# Patient Record
Sex: FEMALE | Race: BLACK OR AFRICAN AMERICAN | Employment: UNEMPLOYED | URBAN - METROPOLITAN AREA
[De-identification: names, ages, dates, MRNs, and addresses within clinical notes are randomized per-mention and may not be internally consistent; named-entity substitution may affect disease eponyms.]

---
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
## 2022-01-18 ENCOUNTER — HOSPITAL ENCOUNTER (EMERGENCY)
Age: 22
Discharge: HOME | End: 2022-01-18
Attending: EMERGENCY MEDICINE
Payer: COMMERCIAL

## 2022-01-18 VITALS
HEART RATE: 69 BPM | DIASTOLIC BLOOD PRESSURE: 78 MMHG | OXYGEN SATURATION: 99 % | SYSTOLIC BLOOD PRESSURE: 118 MMHG | RESPIRATION RATE: 18 BRPM | TEMPERATURE: 97 F

## 2022-01-18 DIAGNOSIS — J06.9 VIRAL URI: Primary | ICD-10-CM

## 2022-01-18 PROCEDURE — 99283 EMERGENCY DEPT VISIT LOW MDM: CPT

## 2022-01-18 PROCEDURE — U0003 INFECTIOUS AGENT DETECTION BY NUCLEIC ACID (DNA OR RNA); SEVERE ACUTE RESPIRATORY SYNDROME CORONAVIRUS 2 (SARS-COV-2) (CORONAVIRUS DISEASE [COVID-19]), AMPLIFIED PROBE TECHNIQUE, MAKING USE OF HIGH THROUGHPUT TECHNOLOGIES AS DESCRIBED BY CMS-2020-01-R: HCPCS

## 2022-01-18 PROCEDURE — U0005 INFEC AGEN DETEC AMPLI PROBE: HCPCS

## 2022-01-18 RX ORDER — BENZONATATE 100 MG/1
100 CAPSULE ORAL 3 TIMES DAILY PRN
Qty: 15 CAPSULE | Refills: 0 | Status: SHIPPED | OUTPATIENT
Start: 2022-01-18 | End: 2022-01-25

## 2022-01-18 RX ORDER — ONDANSETRON 4 MG/1
4 TABLET, ORALLY DISINTEGRATING ORAL EVERY 8 HOURS PRN
Qty: 15 TABLET | Refills: 0 | Status: SHIPPED | OUTPATIENT
Start: 2022-01-18 | End: 2022-05-11

## 2022-01-18 RX ORDER — IBUPROFEN 600 MG/1
600 TABLET ORAL EVERY 6 HOURS PRN
Qty: 21 TABLET | Refills: 0 | Status: SHIPPED | OUTPATIENT
Start: 2022-01-18 | End: 2022-05-11

## 2022-01-18 RX ORDER — ACETAMINOPHEN 500 MG
1000 TABLET ORAL 4 TIMES DAILY PRN
Qty: 20 TABLET | Refills: 0 | Status: SHIPPED | OUTPATIENT
Start: 2022-01-18 | End: 2022-05-11

## 2022-01-18 ASSESSMENT — ENCOUNTER SYMPTOMS
RHINORRHEA: 1
VOMITING: 0
BLOOD IN STOOL: 0
DIARRHEA: 0
ABDOMINAL PAIN: 0
COUGH: 1
SORE THROAT: 1
CONSTIPATION: 0
SHORTNESS OF BREATH: 1
NAUSEA: 1

## 2022-01-18 ASSESSMENT — PAIN SCALES - GENERAL: PAINLEVEL_OUTOF10: 6

## 2022-01-18 NOTE — ED PROVIDER NOTES
[facility]  ED  Emergency Department Encounter  Emergency Medicine Resident     Pt Name: Lyndon Gamboa  IZQ:[id number]  [place] 2000  Date of evaluation: 1/18/22  PCP:  ALFREDO Bone CNP    CHIEF COMPLAINT       Chief Complaint   Patient presents with    Concern For COVID-19     cough, loss of taste, fever, congestion     HISTORY OF PRESENT ILLNESS  (Location/Symptom, Timing/Onset, Context/Setting, Quality, Duration, ModifyingFactors, Severity.)      Lyndon Gamboa is a 25 y.o. female who presents for evaluation of congestion, sore throat, cough, myalgias, sneezing, loss of taste, chest discomfort (particularly with cough), shortness of breath and mild nausea since yesterday. No fever, chills, abdominal pain, vomiting, diarrhea, vaginal symptoms, urinary symptoms. Patient had 1st 2 Pfizer vaccines 6 months ago. No known sick contacts. Has taken NyQuil, Benadryl at home. PAST MEDICAL / SURGICAL / SOCIAL /FAMILY HISTORY      has a past medical history of Eczema. No other pertinent PMH on review with patient/guardian. has no past surgical history on file. No other pertinent PSH on review with patient/guardian.   Social History     Socioeconomic History    Marital status: Single     Spouse name: Not on file    Number of children: Not on file    Years of education: Not on file    Highest education level: Not on file   Occupational History    Not on file   Tobacco Use    Smoking status: Never Smoker    Smokeless tobacco: Never Used   Substance and Sexual Activity    Alcohol use: No    Drug use: No    Sexual activity: Not Currently   Other Topics Concern    Not on file   Social History Narrative    Not on file     Social Determinants of Health     Financial Resource Strain:     Difficulty of Paying Living Expenses: Not on file   Food Insecurity:     Worried About Running Out of Food in the Last Year: Not on file    Akil of Food in the Last Year: Not on file   Transportation Needs:     Lack of Transportation (Medical): Not on file    Lack of Transportation (Non-Medical): Not on file   Physical Activity:     Days of Exercise per Week: Not on file    Minutes of Exercise per Session: Not on file   Stress:     Feeling of Stress : Not on file   Social Connections:     Frequency of Communication with Friends and Family: Not on file    Frequency of Social Gatherings with Friends and Family: Not on file    Attends Rastafarian Services: Not on file    Active Member of [address] [place] [address] [employer] or Organizations: Not on file    Attends Club or Organization Meetings: Not on file    Marital Status: Not on file   Intimate Partner Violence:     Fear of Current or Ex-Partner: Not on file    Emotionally Abused: Not on file    Physically Abused: Not on file    Sexually Abused: Not on file   Housing Stability:     Unable to Pay for Housing in the Last Year: Not on file    Number of [place] in the Last Year: Not on file    Unstable Housing in the Last Year: Not on file       I counseled the patient against using tobacco products. History reviewed. No pertinent family history. No other pertinent FamHx on review with patient/guardian. Allergies:  Patient has no known allergies. Home Medications:  Prior to Admission medications    Medication Sig Start Date End Date Taking?  Authorizing Provider   ibuprofen (IBU) 600 MG tablet Take 1 tablet by mouth every 6 hours as needed for Pain 1/18/22  Yes Carmelita Ram DO   acetaminophen (TYLENOL) 500 MG tablet Take 2 tablets by mouth 4 times daily as needed for Pain 1/18/22  Yes Carmelita Ram DO   ondansetron (ZOFRAN ODT) 4 MG disintegrating tablet Take 1 tablet by mouth every 8 hours as needed for Nausea 1/18/22  Yes Carmelita Ram DO   benzonatate (TESSALON PERLES) 100 MG capsule Take 1 capsule by mouth 3 times daily as needed for Cough 1/18/22 1/25/22 Yes Carmelita Ram DO   loratadine (CLARITIN) 10 MG tablet Take 1 tablet by mouth daily as needed (allergies)  Patient not taking: Reported on 10/24/2019 4/20/18   Syed Delgado MD   fluticasone (FLONASE) 50 MCG/ACT nasal spray 1 spray by Nasal route daily  Patient not taking: Reported on 10/24/2019 4/20/18   Syed Delgado MD       REVIEW OF SYSTEMS    (2-9 systems for level 4, 10 ormore for level 5)      Review of Systems   Constitutional: Negative for chills and fever. HENT: Positive for congestion, rhinorrhea and sore throat. Eyes: Negative for visual disturbance. Respiratory: Positive for cough and shortness of breath. Cardiovascular: Positive for chest pain. Gastrointestinal: Positive for nausea. Negative for abdominal pain, blood in stool, constipation, diarrhea and vomiting. Genitourinary: Negative for dysuria, frequency, urgency, vaginal bleeding and vaginal discharge. Skin: Negative for rash. Allergic/Immunologic: Negative for immunocompromised state. Neurological: Negative for headaches. Hematological: Does not bruise/bleed easily. PHYSICAL EXAM   (up to 7 for level 4, 8 or more for level 5)      INITIAL VITALS:   /78   Pulse 69   Temp 97 °F (36.1 °C) (Oral)   Resp 18   LMP  (LMP Unknown)   SpO2 99%     Physical Exam  Constitutional:       General: She is not in acute distress. Appearance: Normal appearance. She is not ill-appearing, toxic-appearing or diaphoretic. HENT:      Head: Normocephalic and atraumatic. Right Ear: External ear normal.      Left Ear: External ear normal.      Mouth/Throat:      Pharynx: No oropharyngeal exudate or posterior oropharyngeal erythema. Eyes:      General:         Right eye: No discharge. Left eye: No discharge. Cardiovascular:      Rate and Rhythm: Normal rate and regular rhythm. Pulses: Normal pulses. Heart sounds: No murmur heard. Pulmonary:      Effort: Pulmonary effort is normal. No respiratory distress. Breath sounds: Normal breath sounds.  No wheezing, rhonchi or rales. Abdominal:      Palpations: Abdomen is soft. Tenderness: There is no abdominal tenderness. There is no right CVA tenderness, left CVA tenderness, guarding or rebound. Skin:     Capillary Refill: Capillary refill takes less than 2 seconds. Neurological:      General: No focal deficit present. Mental Status: She is alert. DIFFERENTIAL  DIAGNOSIS     PLAN (LABS / IMAGING / EKG):  Orders Placed This Encounter   Procedures    COVID-19       MEDICATIONS ORDERED:  Orders Placed This Encounter   Medications    ibuprofen (IBU) 600 MG tablet     Sig: Take 1 tablet by mouth every 6 hours as needed for Pain     Dispense:  21 tablet     Refill:  0    acetaminophen (TYLENOL) 500 MG tablet     Sig: Take 2 tablets by mouth 4 times daily as needed for Pain     Dispense:  20 tablet     Refill:  0    ondansetron (ZOFRAN ODT) 4 MG disintegrating tablet     Sig: Take 1 tablet by mouth every 8 hours as needed for Nausea     Dispense:  15 tablet     Refill:  0    benzonatate (TESSALON PERLES) 100 MG capsule     Sig: Take 1 capsule by mouth 3 times daily as needed for Cough     Dispense:  15 capsule     Refill:  0       DIAGNOSTIC RESULTS / EMERGENCY DEPARTMENT COURSE / MDM     LABS:  No results found for this visit on 01/18/22. IMPRESSION/MDM/ED COURSE:  25 y.o. female presented with congestion, sore throat, cough, myalgias, sneezing, loss of taste, chest discomfort (particularly with cough), shortness of breath and mild nausea since yesterday. Patient afebrile vitals WNL. Patient signed 99 to 100% on room air. On exam patient resting comfortably no acute distress, nontoxic-appearing. Heart RRR, lungs clear. Abdomen soft nontender. Low risk for ACS, dissection. No fever or productive cough and lungs are clear therefore do not feel CXR is necessary at this time. Symptoms concerning for COVID, will obtain nonrapid swab as patient is well-appearing and stable for discharge.   Symptomatic treatment with Tylenol, ibuprofen, Tessalon Perles, Zofran. I discussed signs and symptoms that would require reevaluation in the ED. The patient expressed understanding and agreement with plan. All questions answered. RADIOLOGY:  No orders to display       EKG  None    All EKG's are interpreted by the Emergency Department Physician who either signs or Co-signs this chart in the absence of a cardiologist.    PROCEDURES:  None    CONSULTS:  None    FINAL IMPRESSION      1.  Viral URI          DISPOSITION / PLAN     DISPOSITION Decision To Discharge 01/18/2022 07:17:23 PM      PATIENT REFERREDTO:  Brandee Ibarra, APRN - CNP  [id number]  [phone]      As needed      DISCHARGE MEDICATIONS:  Discharge Medication List as of 1/18/2022  7:23 PM      START taking these medications    Details   ondansetron (ZOFRAN ODT) 4 MG disintegrating tablet Take 1 tablet by mouth every 8 hours as needed for Nausea, Disp-15 tablet, R-0Print      benzonatate (TESSALON PERLES) 100 MG capsule Take 1 capsule by mouth 3 times daily as needed for Cough, Disp-15 capsule, R-0Print             Skeet DO Liliana  PGY 2  Resident Physician Emergency Medicine  01/18/22 7:38 PM    (Please note that portions of this note were completed with a voice recognition program.Efforts were made to edit the dictations but occasionally words are mis-transcribed.)       Carmelita Ram DO  Resident  01/18/22 2990

## 2022-01-19 LAB
SARS-COV-2: ABNORMAL
SARS-COV-2: DETECTED
SOURCE: ABNORMAL

## 2022-01-19 NOTE — ED PROVIDER NOTES
with complaint of cough congestion body aches. Concern for COVID. No fevers. No chest pain no shortness of breath. Physical:     oral temperature is 97 °F (36.1 °C). Her blood pressure is 118/78 and her pulse is 69. Her respiration is 18 and oxygen saturation is 99%.    25 y.o. female no acute distress. Cardiac exam regular rate and rhythm no murmurs rubs gallops, pulmonary clear bilaterally abdomen soft nontender nondistended.     Impression: Concern for COVID    Plan: COVID swab, symptomatic treatment       Rosita Valverde MD, Santy Jurado  Attending Emergency Physician         Linda Cabrales MD  01/18/22 2001

## 2022-05-05 ENCOUNTER — HOSPITAL ENCOUNTER (EMERGENCY)
Age: 22
Discharge: HOME | End: 2022-05-05
Attending: EMERGENCY MEDICINE
Payer: COMMERCIAL

## 2022-05-05 VITALS
OXYGEN SATURATION: 99 % | TEMPERATURE: 98.6 F | RESPIRATION RATE: 18 BRPM | HEART RATE: 89 BPM | DIASTOLIC BLOOD PRESSURE: 74 MMHG | SYSTOLIC BLOOD PRESSURE: 119 MMHG

## 2022-05-05 DIAGNOSIS — R56.9 SEIZURE-LIKE ACTIVITY (HCC): Primary | ICD-10-CM

## 2022-05-05 LAB
HCG QUANTITATIVE: <1 MIU/ML
KEPPRA: 8 UG/ML

## 2022-05-05 PROCEDURE — 99284 EMERGENCY DEPT VISIT MOD MDM: CPT

## 2022-05-05 PROCEDURE — 80177 DRUG SCRN QUAN LEVETIRACETAM: CPT

## 2022-05-05 PROCEDURE — 99442 PR PHYS/QHP TELEPHONE EVALUATION 11-20 MIN: CPT | Performed by: PSYCHIATRY & NEUROLOGY

## 2022-05-05 PROCEDURE — 84702 CHORIONIC GONADOTROPIN TEST: CPT

## 2022-05-05 PROCEDURE — 93005 ELECTROCARDIOGRAM TRACING: CPT | Performed by: STUDENT IN AN ORGANIZED HEALTH CARE EDUCATION/TRAINING PROGRAM

## 2022-05-05 ASSESSMENT — ENCOUNTER SYMPTOMS
ABDOMINAL PAIN: 0
EYE PAIN: 0
ABDOMINAL DISTENTION: 0
SHORTNESS OF BREATH: 0
CONSTIPATION: 0
SORE THROAT: 0
SINUS PRESSURE: 0
COUGH: 0
SINUS PAIN: 0
EYE ITCHING: 0
NAUSEA: 0
DIARRHEA: 0

## 2022-05-05 NOTE — Clinical Note
Chelsie Gamboa was seen and treated in our emergency department on 5/5/2022. She may return to work on 05/06/2022. If you have any questions or concerns, please don't hesitate to call.       Zoraida David, DO

## 2022-05-06 LAB
EKG ATRIAL RATE: 76 BPM
EKG P AXIS: 35 DEGREES
EKG P-R INTERVAL: 144 MS
EKG Q-T INTERVAL: 348 MS
EKG QRS DURATION: 80 MS
EKG QTC CALCULATION (BAZETT): 391 MS
EKG R AXIS: -5 DEGREES
EKG T AXIS: 3 DEGREES
EKG VENTRICULAR RATE: 76 BPM

## 2022-05-06 NOTE — ED PROVIDER NOTES
[facility]  ED     Emergency Department     Faculty Attestation    I performed a history and physical examination of the patient and discussed management with the resident. I reviewed the residents note and agree with the documented findings and plan of care. Any areas of disagreement are noted on the chart. I was personally present for the key portions of any procedures. I have documented in the chart those procedures where I was not present during the key portions. I have reviewed the emergency nurses triage note. I agree with the chief complaint, past medical history, past surgical history, allergies, medications, social and family history as documented unless otherwise noted below. For Physician Assistant/ Nurse Practitioner cases/documentation I have personally evaluated this patient and have completed at least one if not all key elements of the E/M (history, physical exam, and MDM). Additional findings are as noted. Patient here with reported seizures seem to be triggered by flashing bright lights. No history of seizures prior to this. Was seen at [facility] started on [address] from the ED but not admitted. Significant other states trying to get into neurology but has not seen one yet. Episode earlier today and per significant other had 1 in the waiting room although was not reported to me by triage staff. On exam patient has dark sunglasses on to shield her eyes from the light. EKG interpretation: Sinus rhythm 76 normal intervals. Normal axis. No acute ST or T changes.     Critical Care     none    Radha Fowler MD, Tona Recinos  Attending Emergency  Physician             Radha Fowler MD  05/05/22 8230

## 2022-05-06 NOTE — ED PROVIDER NOTES
[facility]  ED  Emergency Department Encounter  EmergencyMedicine Resident     Pt Wanda Grajeda  MRN: [id number]  [place] 2000  Date of evaluation: 5/5/22  PCP:  ALFREDO Lynch CNP    This patient was evaluated in the Emergency Department for symptoms described in the history of present illness. The patient was evaluated in the context of the global COVID-19 pandemic, which necessitated consideration that the patient might be at risk for infection with the SARS-CoV-2 virus that causes COVID-19. Institutional protocols and algorithms that pertain to the evaluation of patients at risk for COVID-19 are in a state of rapid change based on information released by regulatory bodies including the CDC and federal and state organizations. These policies and algorithms were followed during the patient's care in the ED. CHIEF COMPLAINT       Chief Complaint   Patient presents with    Seizures       HISTORY OF PRESENT ILLNESS  (Location/Symptom, Timing/Onset, Context/Setting, Quality, Duration, Modifying Factors, Severity.)      Dallin Toscano is a 25 y.o. female who presents with seizure-like activity. Patient provides no history and all information is provided by her boyfriend. Boyfriend states that he originally came to the emergency department for a doctor's notes that the patient could continue working but while in the waiting room the patient experienced another seizure-like episode. They are described as upper extremity and shoulder shaking typically triggered by bright lights followed by a period of somnolence. These started about 1 week ago and episodes typically last for 1 to 2 minutes. Only one episode has been witnessed today. Boyfriend states that the patient was having another episode during examination, however the patient is following commands although not responding verbally to questions.   Of note they were seen less than 1 week ago at [facility] for similar symptoms and CT head at that time was negative. Full metabolic assessment was performed including prolactin, thyroid levels, hCG, blood counts and metabolic panel, all were unremarkable. They were advised to follow up with neuro and started on keppra. Patient is reportedly otherwise healthy. PAST MEDICAL / SURGICAL / SOCIAL / FAMILY HISTORY      has a past medical history of Eczema. has no past surgical history on file. Social History     Socioeconomic History    Marital status: Single     Spouse name: Not on file    Number of children: Not on file    Years of education: Not on file    Highest education level: Not on file   Occupational History    Not on file   Tobacco Use    Smoking status: Never Smoker    Smokeless tobacco: Never Used   Substance and Sexual Activity    Alcohol use: No    Drug use: No    Sexual activity: Not Currently   Other Topics Concern    Not on file   Social History Narrative    Not on file     Social Determinants of Health     Financial Resource Strain:     Difficulty of Paying Living Expenses: Not on file   Food Insecurity:     Worried About Running Out of Food in the Last Year: Not on file    Akil of Food in the Last Year: Not on file   Transportation Needs:     Lack of Transportation (Medical): Not on file    Lack of Transportation (Non-Medical):  Not on file   Physical Activity:     Days of Exercise per Week: Not on file    Minutes of Exercise per Session: Not on file   Stress:     Feeling of Stress : Not on file   Social Connections:     Frequency of Communication with Friends and Family: Not on file    Frequency of Social Gatherings with Friends and Family: Not on file    Attends Tenriism Services: Not on file    Active Member of Clubs or Organizations: Not on file    Attends Club or Organization Meetings: Not on file    Marital Status: Not on file   Intimate Partner Violence:     Fear of Current or Ex-Partner: Not on file   [employer] Abused: Not on file    Physically Abused: Not on file    Sexually Abused: Not on file   Housing Stability:     Unable to Pay for Housing in the Last Year: Not on file    Number of Places Lived in the Last Year: Not on file    Unstable Housing in the Last Year: Not on file       No family history on file. Allergies:  Patient has no known allergies. Home Medications:  Prior to Admission medications    Medication Sig Start Date End Date Taking? Authorizing Provider   ibuprofen (IBU) 600 MG tablet Take 1 tablet by mouth every 6 hours as needed for Pain 1/18/22   Francheska Comber, DO   acetaminophen (TYLENOL) 500 MG tablet Take 2 tablets by mouth 4 times daily as needed for Pain 1/18/22   Francheska Comber, DO   ondansetron (ZOFRAN ODT) 4 MG disintegrating tablet Take 1 tablet by mouth every 8 hours as needed for Nausea 1/18/22   Francheska Comber, DO   loratadine (CLARITIN) 10 MG tablet Take 1 tablet by mouth daily as needed (allergies)  Patient not taking: Reported on 10/24/2019 4/20/18   Terrell Martini MD   fluticasone (FLONASE) 50 MCG/ACT nasal spray 1 spray by Nasal route daily  Patient not taking: Reported on 10/24/2019 4/20/18   Terrell Martini MD       REVIEW OF SYSTEMS    (2-9 systems for level 4, 10 or more for level 5)      Review of Systems   Constitutional: Negative for activity change, chills and fever. HENT: Negative for congestion, sinus pressure, sinus pain and sore throat. Eyes: Negative for pain and itching. Respiratory: Negative for cough and shortness of breath. Cardiovascular: Negative for chest pain. Gastrointestinal: Negative for abdominal distention, abdominal pain, constipation, diarrhea and nausea. Endocrine: Negative for polyuria. Genitourinary: Negative for dysuria and frequency. Musculoskeletal: Negative for arthralgias. Skin: Negative for rash. Neurological: Positive for seizures. Negative for light-headedness and headaches.        PHYSICAL EXAM   (up to 7 for level 4, 8 or more for level 5)      INITIAL VITALS:   /74   Pulse 89   Temp 98.6 °F (37 °C)   Resp 18   SpO2 99%     Physical Exam  Vitals reviewed. Constitutional:       General: She is not in acute distress. HENT:      Head: Normocephalic and atraumatic. Ears:      Comments: Hearing grossly normal     Nose: Nose normal.      Mouth/Throat:      Mouth: Mucous membranes are moist.      Pharynx: Oropharynx is clear. Eyes:      General: No scleral icterus. Conjunctiva/sclera: Conjunctivae normal.      Pupils: Pupils are equal, round, and reactive to light. Cardiovascular:      Rate and Rhythm: Normal rate and regular rhythm. Pulses: Normal pulses. Pulmonary:      Effort: Pulmonary effort is normal. No respiratory distress. Breath sounds: Normal breath sounds. Abdominal:      General: There is no distension. Tenderness: There is no abdominal tenderness. There is no guarding. Musculoskeletal:      Cervical back: No muscular tenderness. Right lower leg: No edema. Left lower leg: No edema. Skin:     General: Skin is warm and dry. Capillary Refill: Capillary refill takes less than 2 seconds. Neurological:      General: No focal deficit present. Mental Status: She is alert. Mental status is at baseline. Comments: Not responding to questions but she does follow commands to squeeze hands, push and pull. Pupils are equal and reactive. She is only intermittently cooperative. DIFFERENTIAL  DIAGNOSIS     PLAN (LABS / IMAGING / EKG):  Orders Placed This Encounter   Procedures    HCG, QUANTITATIVE, PREGNANCY    Levetiracetam Level    Inpatient consult to Neurology    EKG 12 Lead       MEDICATIONS ORDERED:  No orders of the defined types were placed in this encounter.         DIAGNOSTIC RESULTS / EMERGENCY DEPARTMENT COURSE / MDM   LAB RESULTS:  Results for orders placed or performed during the hospital encounter of 05/05/22   HCG, QUANTITATIVE, PREGNANCY   Result Value Ref Range    hCG Quant <1 <5 mIU/mL   Levetiracetam Level   Result Value Ref Range    Levetiracetam Lvl 8 ug/mL       IMPRESSION: You Curtis is a 25 y.o. woman presenting for seizure-like activity. She was recently seen for the same and had a negative work-up and was advised to follow-up outpatient for an EEG. She has not had any confirmatory EEGs at this point. Her boyfriend did report that she was experiencing a minor episode of this reported witnessed activity during physical exam however she was still able to follow commands during this time even though she was verbally unresponsive. This did resolve during her observation in the emergency department and she was later able to answer questions appropriately. 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 Keppra levels were found to be therapeutic. Negative pregnancy test.  She was seen by neurology and recommended for outpatient follow-up w/EEG and continue keppra w/o dose change. RADIOLOGY:  No results found. EKG  Normal rate, sinus, normal axis, no ST segment elevations or depressions    All EKG's are interpreted by the Emergency Department Physician who either signs or Co-signs this chart in the absence of a cardiologist.    EMERGENCY DEPARTMENT COURSE:  Patient seen and evaluated, VSS and nontoxic in appearance. ED Course as of 05/06/22 0141   Thu May 05, 2022   2231 Seen by neurology, rec outpt EEG, continue keppra with no dose change [LR]   2232 Levetiracetam: 8 [LR]      ED Course User Index  [LR] Zoraida David,         No notes of EC Admission Criteria type on file. PROCEDURES:  none    CONSULTS:  IP CONSULT TO NEUROLOGY    CRITICAL CARE:  See attending note    FINAL IMPRESSION      1.  Seizure-like activity ([facility] [address]          DISPOSITION / PLAN     DISPOSITION Decision To Discharge 05/05/2022 10:45:40 PM      PATIENT REFERRED TO:  ALFREDO Lincoln - CNP  [id number] [id number]  [phone]      As needed, If symptoms worsen    [facility]  [address] [facility] [address]  [phone]    As needed, If symptoms worsen      DISCHARGE MEDICATIONS:  Discharge Medication List as of 5/5/2022 10:31 PM          Ciera Hernandez DO  Emergency Medicine Resident    (Please note that portions of thisnote were completed with a voice recognition program.  Efforts were made to edit the dictations but occasionally words are mis-transcribed.)       Ciera Hernandez DO  Resident  05/06/22 0850

## 2022-05-11 ENCOUNTER — OFFICE VISIT (OUTPATIENT)
Dept: PRIMARY CARE CLINIC | Age: 22
End: 2022-05-11
Payer: COMMERCIAL

## 2022-05-11 VITALS
BODY MASS INDEX: 31.21 KG/M2 | OXYGEN SATURATION: 98 % | DIASTOLIC BLOOD PRESSURE: 62 MMHG | SYSTOLIC BLOOD PRESSURE: 102 MMHG | HEART RATE: 74 BPM | HEIGHT: 62 IN | RESPIRATION RATE: 16 BRPM | WEIGHT: 169.6 LBS

## 2022-05-11 DIAGNOSIS — Z76.89 ENCOUNTER TO ESTABLISH CARE: Primary | ICD-10-CM

## 2022-05-11 DIAGNOSIS — R56.9 SEIZURE-LIKE ACTIVITY (HCC): ICD-10-CM

## 2022-05-11 DIAGNOSIS — R42 EPISODIC LIGHTHEADEDNESS: ICD-10-CM

## 2022-05-11 PROCEDURE — 1036F TOBACCO NON-USER: CPT | Performed by: PHYSICIAN ASSISTANT

## 2022-05-11 PROCEDURE — G8417 CALC BMI ABV UP PARAM F/U: HCPCS | Performed by: PHYSICIAN ASSISTANT

## 2022-05-11 PROCEDURE — G8427 DOCREV CUR MEDS BY ELIG CLIN: HCPCS | Performed by: PHYSICIAN ASSISTANT

## 2022-05-11 PROCEDURE — 99204 OFFICE O/P NEW MOD 45 MIN: CPT | Performed by: PHYSICIAN ASSISTANT

## 2022-05-11 RX ORDER — LEVETIRACETAM 250 MG/1
TABLET ORAL
COMMUNITY
Start: 2022-05-09 | End: 2022-06-27 | Stop reason: DRUGHIGH

## 2022-05-11 RX ORDER — LEVETIRACETAM 500 MG/1
500 TABLET ORAL 2 TIMES DAILY
COMMUNITY
Start: 2022-04-30 | End: 2022-06-27 | Stop reason: SDUPTHER

## 2022-05-11 SDOH — ECONOMIC STABILITY: FOOD INSECURITY: WITHIN THE PAST 12 MONTHS, YOU WORRIED THAT YOUR FOOD WOULD RUN OUT BEFORE YOU GOT MONEY TO BUY MORE.: NEVER TRUE

## 2022-05-11 SDOH — ECONOMIC STABILITY: FOOD INSECURITY: WITHIN THE PAST 12 MONTHS, THE FOOD YOU BOUGHT JUST DIDN'T LAST AND YOU DIDN'T HAVE MONEY TO GET MORE.: NEVER TRUE

## 2022-05-11 SDOH — ECONOMIC STABILITY: TRANSPORTATION INSECURITY
IN THE PAST 12 MONTHS, HAS THE LACK OF TRANSPORTATION KEPT YOU FROM MEDICAL APPOINTMENTS OR FROM GETTING MEDICATIONS?: NO

## 2022-05-11 SDOH — ECONOMIC STABILITY: TRANSPORTATION INSECURITY
IN THE PAST 12 MONTHS, HAS LACK OF TRANSPORTATION KEPT YOU FROM MEETINGS, WORK, OR FROM GETTING THINGS NEEDED FOR DAILY LIVING?: NO

## 2022-05-11 ASSESSMENT — ENCOUNTER SYMPTOMS
CONSTIPATION: 0
VOMITING: 0
COUGH: 0
SINUS PAIN: 0
NAUSEA: 0
ABDOMINAL PAIN: 0
BACK PAIN: 0
RHINORRHEA: 0
PHOTOPHOBIA: 1
SHORTNESS OF BREATH: 0
DIARRHEA: 0

## 2022-05-11 ASSESSMENT — PATIENT HEALTH QUESTIONNAIRE - PHQ9
SUM OF ALL RESPONSES TO PHQ QUESTIONS 1-9: 0
SUM OF ALL RESPONSES TO PHQ QUESTIONS 1-9: 0
2. FEELING DOWN, DEPRESSED OR HOPELESS: 0
1. LITTLE INTEREST OR PLEASURE IN DOING THINGS: 0
SUM OF ALL RESPONSES TO PHQ9 QUESTIONS 1 & 2: 0
SUM OF ALL RESPONSES TO PHQ QUESTIONS 1-9: 0
SUM OF ALL RESPONSES TO PHQ QUESTIONS 1-9: 0

## 2022-05-11 ASSESSMENT — SOCIAL DETERMINANTS OF HEALTH (SDOH): HOW HARD IS IT FOR YOU TO PAY FOR THE VERY BASICS LIKE FOOD, HOUSING, MEDICAL CARE, AND HEATING?: NOT VERY HARD

## 2022-05-11 NOTE — PROGRESS NOTES
[address] PRIMARY CARE  [address] [place]  [address]  [address] [address]  Dept: [phone]  Dept Fax: [phone]    Bakari Cardona is a 25 y.o. female who presents today for her medical conditions/complaints as noted below. Chief Complaint   Patient presents with   [address]     Follow up from [facility] ED on 2022 for seizure like activity, needs referral to Neurology       HPI:     Patient presents to the office to establish care. No recent PCP. She is accompanied by boyfriend who helps provide history. Primary concern is following up on seizure-like activity. She reports for the past 10 days she has had intermittent episodes of seizures/lightheadedness/dizziness. She has been evaluated at local ED on 3 separate occasions including [facility], [facility], [facility].  She has had CT scan and lab work-up and EKG which are negative. She has been discharged and is currently taking 750 mg Keppra twice daily. Patient denies any known head injury/trauma. No prior history of seizures or surgery. She reports intermittent episodes of lightheadedness, dizziness, shakiness, possible seizures. These are intermittent throughout the day. They are exacerbated with bright lights, stress, feeling worked up. She cannot identify specific loss of consciousness, convulsions, loss of bowel or bladder function. No current concerns for anxiety or depression. No significant family history of seizure disorder. Patient does report that she is supposed to be wearing corrective lenses, however not followed up with eye specialist in a while. No other concerns or complaints. BP stable. Weight stable.         No results found for: LABA1C          ( goal A1C is < 7)   No results found for: LABMICR  No results found for: LDLCHOLESTEROL, LDLCALC    (goal LDL is <100)   AST (U/L)   Date Value   2017 18     ALT (U/L)   Date Value   2017 8     BP Readings from Last 3 Encounters:   05/11/22 102/62   05/05/22 119/74   01/18/22 118/78          (goal 120/80)    Past Medical History:   Diagnosis Date    Eczema       History reviewed. No pertinent surgical history. History reviewed. No pertinent family history. Social History     Tobacco Use    Smoking status: Never Smoker    Smokeless tobacco: Never Used   Substance Use Topics    Alcohol use: No      Current Outpatient Medications   Medication Sig Dispense Refill    levETIRAcetam (KEPPRA) 500 MG tablet Take 500 mg by mouth 2 times daily      levETIRAcetam (KEPPRA) 250 MG tablet TAKE 1 TABLET BY MOUTH TWICE DAILY       No current facility-administered medications for this visit. No Known Allergies    Health Maintenance   Topic Date Due    Varicella vaccine (1 of 2 - 2-dose childhood series) Never done    COVID-19 Vaccine (1) Never done    Depression Screen  Never done    HIV screen  Never done    Hepatitis C screen  Never done    Flu vaccine (Season Ended) 09/01/2022    Chlamydia screen  11/22/2022    DTaP/Tdap/Td vaccine (2 - Td or Tdap) 11/05/2024    HPV vaccine  Completed    Meningococcal (ACWY) vaccine  Completed    Hepatitis A vaccine  Aged Out    Hepatitis B vaccine  Aged Out    Hib vaccine  Aged Out    Pneumococcal 0-64 years Vaccine  Aged Out       Subjective:      Review of Systems   Constitutional: Negative for chills, fatigue and fever. HENT: Negative for congestion, rhinorrhea and sinus pain. Eyes: Positive for photophobia. Respiratory: Negative for cough and shortness of breath. Cardiovascular: Negative for chest pain and leg swelling. Gastrointestinal: Negative for abdominal pain, constipation, diarrhea, nausea and vomiting. Genitourinary: Negative for difficulty urinating, frequency and urgency. Musculoskeletal: Negative for arthralgias, back pain and myalgias. Neurological: Positive for dizziness, seizures, light-headedness and headaches. Negative for tremors, facial asymmetry, speech difficulty and weakness. Psychiatric/Behavioral: Negative for confusion, dysphoric mood and sleep disturbance. The patient is not nervous/anxious. All other systems reviewed and are negative. Objective:     Physical Exam  Vitals and nursing note reviewed. Constitutional:       General: She is not in acute distress. Appearance: Normal appearance. She is obese. HENT:      Head: Normocephalic. Mouth/Throat:      Mouth: Mucous membranes are moist.   Eyes:      Extraocular Movements: Extraocular movements intact. Conjunctiva/sclera: Conjunctivae normal.      Pupils: Pupils are equal, round, and reactive to light. Cardiovascular:      Rate and Rhythm: Normal rate and regular rhythm. Pulses: Normal pulses. Heart sounds: Normal heart sounds. Pulmonary:      Effort: Pulmonary effort is normal.      Breath sounds: Normal breath sounds. Abdominal:      General: Abdomen is flat. Bowel sounds are normal.      Palpations: Abdomen is soft. Tenderness: There is no abdominal tenderness. Musculoskeletal:      Cervical back: Normal range of motion. Right lower leg: No edema. Left lower leg: No edema. Lymphadenopathy:      Cervical: No cervical adenopathy. Skin:     General: Skin is warm. Capillary Refill: Capillary refill takes less than 2 seconds. Neurological:      General: No focal deficit present. Mental Status: She is alert and oriented to person, place, and time. GCS: GCS eye subscore is 4. GCS verbal subscore is 5. GCS motor subscore is 6. Cranial Nerves: Cranial nerves are intact. No cranial nerve deficit or facial asymmetry. Motor: Motor function is intact. No weakness. Gait: Gait is intact. Gait normal.      Comments: She does present in sunglasses to block bright lights.    Psychiatric:         Mood and Affect: Mood normal.         Behavior: Behavior normal.       /62 (Site: Left Upper Arm, Position: Sitting, Cuff Size: Medium Adult)   Pulse 74   Resp 16   Ht 5' 1.5\" (1.562 m)   Wt 169 lb 9.6 oz (76.9 kg)   SpO2 98%   Breastfeeding No   BMI 31.53 kg/m²     Assessment:       ICD-10-CM    1. Encounter to establish care  Z76.89    2. Seizure-like activity (HCC)  R56.9 EEG     MRI BRAIN W WO CONTRAST   3. Episodic lightheadedness  R42             Plan:       1. Initial visit to establish care.  2, 3. There is concern for new onset seizure-like activity. She has been evaluated in the ED multiple times. She is currently taking Keppra 750 mg twice daily. She has an appointment with neurology coming up in August.  Plan to obtain MRI brain and EEG before neurology appointment. I discussed that periodic lightheadedness can also be due to dehydration, stress, poor sleep. I advised importance of sleeping schedule, regular hydration and compression socks. Reviewed significant medical history, prior charts, documentation. Return in about 3 months (around 8/22/2022) for after following with Neurology. Orders Placed This Encounter   Procedures    MRI BRAIN W WO CONTRAST     Standing Status:   Future     Standing Expiration Date:   5/11/2023     Order Specific Question:   STAT Creatinine as needed:     Answer:   Yes     Order Specific Question:   Reason for exam:     Answer:   recent history of seizure like activity     Order Specific Question:   What is the sedation requirement? Answer:   None    EEG     Standing Status:   Future     Standing Expiration Date:   7/10/2022         Patient given educational materials - see patient instructions. Discussed use, benefit, and side effects of prescribed medications. All patient questions answered. Pt voiced understanding. Reviewed health maintenance. Instructed to continue current medications, diet and exercise. Patient agreed with treatment plan. Follow up as directed.         Electronically signed by Asuncion Miller PA-C on 5/11/2022 at 12:32 PM

## 2022-06-27 ENCOUNTER — TELEPHONE (OUTPATIENT)
Dept: PRIMARY CARE CLINIC | Age: 22
End: 2022-06-27

## 2022-06-27 RX ORDER — LEVETIRACETAM 500 MG/1
500 TABLET ORAL 2 TIMES DAILY
Qty: 60 TABLET | Refills: 1 | Status: SHIPPED | OUTPATIENT
Start: 2022-06-27 | End: 2022-08-09 | Stop reason: SDUPTHER

## 2022-06-27 NOTE — TELEPHONE ENCOUNTER
----- Message from [place] Terese sent at 6/27/2022 10:43 AM EDT -----  Subject: Message to Provider    QUESTIONS  Information for Provider? pt is wanting to ask pcp if its safe to travel   with her Seizures pt also has a question about a med she is taking   levETIRAcetam (KEPPRA) 500 MG tablet   ---------------------------------------------------------------------------  --------------  CALL BACK INFO  What is the best way for the office to contact you? OK to leave message on   voicemail  Preferred Call Back Phone Number? [phone]  ---------------------------------------------------------------------------  --------------  SCRIPT ANSWERS  Relationship to Patient?  Self

## 2022-06-27 NOTE — TELEPHONE ENCOUNTER
Pt informed, Pt is asking is you can fill the Keppra 500 Mg so she doesn't have to take multiple a day for the 250 Mg.  Please advice

## 2022-06-27 NOTE — TELEPHONE ENCOUNTER
Please advise patient that she should not be driving since last seizure was within 2 to 3 months. Also, make sure she is compliant with Keppra. I do recommend completing MRI and EEG before seeing neurology in August.  She can travel as long as she is a passenger.     Sunday Yisel

## 2022-06-30 DIAGNOSIS — R56.9 SEIZURE-LIKE ACTIVITY (HCC): Primary | ICD-10-CM

## 2022-06-30 RX ORDER — LEVETIRACETAM 250 MG/1
250 TABLET ORAL 2 TIMES DAILY
Qty: 60 TABLET | Refills: 0 | Status: SHIPPED | OUTPATIENT
Start: 2022-06-30

## 2022-08-09 RX ORDER — LEVETIRACETAM 500 MG/1
500 TABLET ORAL 2 TIMES DAILY
Qty: 60 TABLET | Refills: 1 | Status: SHIPPED | OUTPATIENT
Start: 2022-08-09 | End: 2022-09-08

## 2022-08-17 ENCOUNTER — HOSPITAL ENCOUNTER (INPATIENT)
Age: 22
LOS: 1 days | Discharge: HOME | DRG: 053 | End: 2022-08-18
Attending: EMERGENCY MEDICINE | Admitting: INTERNAL MEDICINE
Payer: COMMERCIAL

## 2022-08-17 ENCOUNTER — APPOINTMENT (OUTPATIENT)
Dept: MRI IMAGING | Age: 22
DRG: 053 | End: 2022-08-17
Payer: COMMERCIAL

## 2022-08-17 ENCOUNTER — OFFICE VISIT (OUTPATIENT)
Dept: NEUROLOGY | Age: 22
End: 2022-08-17
Payer: COMMERCIAL

## 2022-08-17 VITALS
SYSTOLIC BLOOD PRESSURE: 95 MMHG | HEIGHT: 63 IN | BODY MASS INDEX: 29.66 KG/M2 | WEIGHT: 167.4 LBS | HEART RATE: 74 BPM | DIASTOLIC BLOOD PRESSURE: 67 MMHG

## 2022-08-17 DIAGNOSIS — R56.9 SEIZURE-LIKE ACTIVITY (HCC): Primary | ICD-10-CM

## 2022-08-17 PROBLEM — G40.919 BREAKTHROUGH SEIZURE (HCC): Status: ACTIVE | Noted: 2022-08-17

## 2022-08-17 LAB
ABSOLUTE EOS #: 0.09 K/UL (ref 0–0.4)
ABSOLUTE IMMATURE GRANULOCYTE: 0 K/UL (ref 0–0.3)
ABSOLUTE LYMPH #: 2.64 K/UL (ref 1.2–5.2)
ABSOLUTE MONO #: 0.26 K/UL (ref 0.2–0.8)
ANION GAP SERPL CALCULATED.3IONS-SCNC: 8 MMOL/L (ref 9–17)
ATYPICAL LYMPHOCYTE ABSOLUTE COUNT: 0.17 K/UL
ATYPICAL LYMPHOCYTES: 2 %
BASOPHILS # BLD: 0 %
BASOPHILS ABSOLUTE: 0 K/UL (ref 0–0.2)
BUN BLDV-MCNC: 4 MG/DL (ref 6–20)
BUN/CREAT BLD: 5 (ref 9–20)
CALCIUM SERPL-MCNC: 8.9 MG/DL (ref 8.6–10.4)
CHLORIDE BLD-SCNC: 102 MMOL/L (ref 98–107)
CO2: 28 MMOL/L (ref 20–31)
CREAT SERPL-MCNC: 0.8 MG/DL (ref 0.5–0.9)
EOSINOPHILS RELATIVE PERCENT: 1 % (ref 1–4)
GFR NON-AFRICAN AMERICAN: ABNORMAL ML/MIN
GFR SERPL CREATININE-BSD FRML MDRD: ABNORMAL ML/MIN/{1.73_M2}
GLUCOSE BLD-MCNC: 90 MG/DL (ref 70–99)
HCT VFR BLD CALC: 38.9 % (ref 36.3–47.1)
HEMOGLOBIN: 12 G/DL (ref 11.9–15.1)
IMMATURE GRANULOCYTES: 0 %
KEPPRA: 5 UG/ML
LYMPHOCYTES # BLD: 31 % (ref 25–45)
MCH RBC QN AUTO: 23.6 PG (ref 25.2–33.5)
MCHC RBC AUTO-ENTMCNC: 30.8 G/DL (ref 28.4–34.8)
MCV RBC AUTO: 76.4 FL (ref 82.6–102.9)
MONOCYTES # BLD: 3 % (ref 2–8)
NRBC AUTOMATED: 0 PER 100 WBC
PDW BLD-RTO: 15 % (ref 11.8–14.4)
PLATELET # BLD: 328 K/UL (ref 138–453)
PMV BLD AUTO: 11.2 FL (ref 8.1–13.5)
POTASSIUM SERPL-SCNC: 3.6 MMOL/L (ref 3.7–5.3)
RBC # BLD: 5.09 M/UL (ref 3.95–5.11)
SEG NEUTROPHILS: 63 % (ref 34–64)
SEGMENTED NEUTROPHILS ABSOLUTE COUNT: 5.34 K/UL (ref 1.8–8)
SODIUM BLD-SCNC: 138 MMOL/L (ref 135–144)
WBC # BLD: 8.5 K/UL (ref 4.5–13.5)

## 2022-08-17 PROCEDURE — G8427 DOCREV CUR MEDS BY ELIG CLIN: HCPCS | Performed by: STUDENT IN AN ORGANIZED HEALTH CARE EDUCATION/TRAINING PROGRAM

## 2022-08-17 PROCEDURE — 2060000000 HC ICU INTERMEDIATE R&B

## 2022-08-17 PROCEDURE — 99221 1ST HOSP IP/OBS SF/LOW 40: CPT | Performed by: NURSE PRACTITIONER

## 2022-08-17 PROCEDURE — 2580000003 HC RX 258: Performed by: NURSE PRACTITIONER

## 2022-08-17 PROCEDURE — 80177 DRUG SCRN QUAN LEVETIRACETAM: CPT

## 2022-08-17 PROCEDURE — G0378 HOSPITAL OBSERVATION PER HR: HCPCS

## 2022-08-17 PROCEDURE — 80048 BASIC METABOLIC PNL TOTAL CA: CPT

## 2022-08-17 PROCEDURE — 85025 COMPLETE CBC W/AUTO DIFF WBC: CPT

## 2022-08-17 PROCEDURE — G8417 CALC BMI ABV UP PARAM F/U: HCPCS | Performed by: STUDENT IN AN ORGANIZED HEALTH CARE EDUCATION/TRAINING PROGRAM

## 2022-08-17 PROCEDURE — 99205 OFFICE O/P NEW HI 60 MIN: CPT | Performed by: STUDENT IN AN ORGANIZED HEALTH CARE EDUCATION/TRAINING PROGRAM

## 2022-08-17 PROCEDURE — 70553 MRI BRAIN STEM W/O & W/DYE: CPT

## 2022-08-17 PROCEDURE — 99285 EMERGENCY DEPT VISIT HI MDM: CPT

## 2022-08-17 PROCEDURE — 1036F TOBACCO NON-USER: CPT | Performed by: STUDENT IN AN ORGANIZED HEALTH CARE EDUCATION/TRAINING PROGRAM

## 2022-08-17 PROCEDURE — A9579 GAD-BASE MR CONTRAST NOS,1ML: HCPCS | Performed by: NURSE PRACTITIONER

## 2022-08-17 PROCEDURE — 6360000004 HC RX CONTRAST MEDICATION: Performed by: NURSE PRACTITIONER

## 2022-08-17 RX ORDER — ACETAMINOPHEN 325 MG/1
650 TABLET ORAL EVERY 6 HOURS PRN
Status: DISCONTINUED | OUTPATIENT
Start: 2022-08-17 | End: 2022-08-18 | Stop reason: HOSPADM

## 2022-08-17 RX ORDER — ENOXAPARIN SODIUM 100 MG/ML
40 INJECTION SUBCUTANEOUS DAILY
Status: DISCONTINUED | OUTPATIENT
Start: 2022-08-18 | End: 2022-08-18 | Stop reason: HOSPADM

## 2022-08-17 RX ORDER — POLYETHYLENE GLYCOL 3350 17 G/17G
17 POWDER, FOR SOLUTION ORAL DAILY PRN
Status: DISCONTINUED | OUTPATIENT
Start: 2022-08-17 | End: 2022-08-18 | Stop reason: HOSPADM

## 2022-08-17 RX ORDER — SODIUM CHLORIDE 0.9 % (FLUSH) 0.9 %
5-40 SYRINGE (ML) INJECTION EVERY 12 HOURS SCHEDULED
Status: DISCONTINUED | OUTPATIENT
Start: 2022-08-17 | End: 2022-08-18 | Stop reason: HOSPADM

## 2022-08-17 RX ORDER — ONDANSETRON 4 MG/1
4 TABLET, ORALLY DISINTEGRATING ORAL EVERY 8 HOURS PRN
Status: DISCONTINUED | OUTPATIENT
Start: 2022-08-17 | End: 2022-08-18 | Stop reason: HOSPADM

## 2022-08-17 RX ORDER — MAGNESIUM SULFATE 1 G/100ML
1000 INJECTION INTRAVENOUS PRN
Status: DISCONTINUED | OUTPATIENT
Start: 2022-08-17 | End: 2022-08-18 | Stop reason: HOSPADM

## 2022-08-17 RX ORDER — POTASSIUM CHLORIDE 7.45 MG/ML
10 INJECTION INTRAVENOUS PRN
Status: DISCONTINUED | OUTPATIENT
Start: 2022-08-17 | End: 2022-08-18 | Stop reason: HOSPADM

## 2022-08-17 RX ORDER — ACETAMINOPHEN 650 MG/1
650 SUPPOSITORY RECTAL EVERY 6 HOURS PRN
Status: DISCONTINUED | OUTPATIENT
Start: 2022-08-17 | End: 2022-08-18 | Stop reason: HOSPADM

## 2022-08-17 RX ORDER — ONDANSETRON 2 MG/ML
4 INJECTION INTRAMUSCULAR; INTRAVENOUS EVERY 6 HOURS PRN
Status: DISCONTINUED | OUTPATIENT
Start: 2022-08-17 | End: 2022-08-18 | Stop reason: HOSPADM

## 2022-08-17 RX ORDER — SODIUM CHLORIDE 9 MG/ML
INJECTION, SOLUTION INTRAVENOUS PRN
Status: DISCONTINUED | OUTPATIENT
Start: 2022-08-17 | End: 2022-08-18 | Stop reason: HOSPADM

## 2022-08-17 RX ORDER — SODIUM CHLORIDE 0.9 % (FLUSH) 0.9 %
10 SYRINGE (ML) INJECTION PRN
Status: DISCONTINUED | OUTPATIENT
Start: 2022-08-17 | End: 2022-08-18 | Stop reason: HOSPADM

## 2022-08-17 RX ORDER — LORAZEPAM 2 MG/ML
1 INJECTION INTRAMUSCULAR EVERY 5 MIN PRN
Status: DISCONTINUED | OUTPATIENT
Start: 2022-08-17 | End: 2022-08-18 | Stop reason: HOSPADM

## 2022-08-17 RX ORDER — POTASSIUM CHLORIDE 20 MEQ/1
40 TABLET, EXTENDED RELEASE ORAL PRN
Status: DISCONTINUED | OUTPATIENT
Start: 2022-08-17 | End: 2022-08-18 | Stop reason: HOSPADM

## 2022-08-17 RX ADMIN — SODIUM CHLORIDE, PRESERVATIVE FREE 10 ML: 5 INJECTION INTRAVENOUS at 23:08

## 2022-08-17 RX ADMIN — GADOTERIDOL 15 ML: 279.3 INJECTION, SOLUTION INTRAVENOUS at 19:15

## 2022-08-17 ASSESSMENT — ENCOUNTER SYMPTOMS
ABDOMINAL PAIN: 0
NAUSEA: 0
DIARRHEA: 0
COLOR CHANGE: 0
BACK PAIN: 0
VOMITING: 0
SHORTNESS OF BREATH: 0
EYE PAIN: 0
PHOTOPHOBIA: 0
COUGH: 0

## 2022-08-17 ASSESSMENT — PAIN - FUNCTIONAL ASSESSMENT: PAIN_FUNCTIONAL_ASSESSMENT: NONE - DENIES PAIN

## 2022-08-17 NOTE — ED NOTES
ED to inpatient nurses report     Chief Complaint   Patient presents with    Seizures      Present to ED from home. LOC: alert and orientated to name, place, date  Vital signs   Vitals:    08/17/22 1525 08/17/22 1600 08/17/22 1733 08/17/22 1829   BP: 94/68 93/66 118/73    Pulse: 56 56 55 73   Resp: 17 17 19 21   Temp:       TempSrc:       SpO2: 98% 98% 98% 98%   Weight:       Height:          Oxygen Baseline room air. Current needs required room air. LDAs:   Peripheral IV 08/17/22 Right Antecubital (Active)   Site Assessment Clean, dry & intact 08/17/22 1255   Line Status Flushed;Normal saline locked 08/17/22 1255   Line Care Connections checked and tightened 08/17/22 1255   Phlebitis Assessment No symptoms 08/17/22 1255   Infiltration Assessment 0 08/17/22 1255   Alcohol Cap Used No 08/17/22 1255   Dressing Status Clean, dry & intact 08/17/22 1255   Dressing Type Transparent 08/17/22 1255   Dressing Intervention New 08/17/22 1255     Mobility: Independent  Fall Risk: Fort[place] 1 Fall Risk  Presents to emergency department  because of falls (Syncope, seizure, or loss of consciousness): No (08/17/22 1234)  Age > 79: No (08/17/22 1234)  Altered Mental Status, Intoxication with alcohol or substance confusion (Disorientation, impaired judgment, poor safety awaremess, or inability to follow instructions): No (08/17/22 1234)  Impaired Mobility: Ambulates or transfers with assistive devices or assistance;  Unable to ambulate or transer.: No (08/17/22 1234)  Nursing Judgement: No (08/17/22 1234)  Pending ED orders: none  Present condition: Fair  Code Status: Full  Consults: IP CONSULT TO INTERNAL MEDICINE  IP CONSULT TO NEUROLOGY  []  Hospitalist  Completed  [] yes [] no Who:   []  Medicine  Completed  [] yes [] No Who:   []  Cardiology  Completed  [] yes [] No Who:   []  GI   Completed  [] yes [] No Who:   []  Neurology  Completed  [] yes [] No Who:   []  Nephrology Completed  [] yes [] No Who:    [] Vascular  Completed  [] yes [] No Who:   []  Ortho  Completed  [] yes [] No Who:     []  Surgery  Completed  [] yes [] No Who:    []  Urology  Completed  [] yes [] No Who:    []  CT Surgery Completed  [] yes [] No Who:   []  Podiatry  Completed  [] yes [] No Who:    []  Other    Completed  [] yes [] No Who:  Interventions: IV, labs, telemetry monitoring, and seizure precautions.   Important Events: none        Electronically signed by Coleman Tabares RN on 8/17/2022 at 7:00 PM       Coleman Tabares RN  08/17/22 [id number]

## 2022-08-17 NOTE — PROGRESS NOTES
[facility] NEUROLOGY SPECIALIST  [address]  Dept: [phone]    PATIENT NAME: Shannan Dalton  PATIENT MRN: [id number]  PRIMARY CARE PHYSICIAN: Sparkle Cavazos PA-C    HPI:      Shannan Dalton is a 25 y.o. female who presents to clinic today for evaluation of Seizures (Since march, patient stating that she feels like seizure may be coming on today.)     Patient notes that she was advised to not take her antiepileptics for 1 week prior to seeing the neurologist.  She has not been taking Keppra and on arrival patient felt like she was about to have a seizure. When I went into the patient's room patient was laying on her side unable to speak. No shaking was noted. Her arms were stiffened up. Was able to follow my commands. She has a grandmother who accompanied the patient today notes that she just took her Keppra prior to me entering the room. 911 was called and patient was taken to [facility] ER. PREVIOUS WORKUP:     Past Medical History:   Diagnosis Date    Eczema         No past surgical history on file.      Social History     Socioeconomic History    Marital status: Single     Spouse name: Not on file    Number of children: Not on file    Years of education: Not on file    Highest education level: Not on file   Occupational History    Not on file   Tobacco Use    Smoking status: Never    Smokeless tobacco: Never   Vaping Use    Vaping Use: Never used   Substance and Sexual Activity    Alcohol use: No    Drug use: No    Sexual activity: Not Currently   Other Topics Concern    Not on file   Social History Narrative    Not on file     Social Determinants of Health     Financial Resource Strain: Low Risk     Difficulty of Paying Living Expenses: Not very hard   Food Insecurity: No Food Insecurity    Worried About Running Out of Food in the Last Year: Never true    Ran Out of Food in the Last Year: Never true   Transportation Needs: No Transportation Needs    Lack of Transportation (Medical): No    Lack of Transportation (Non-Medical): No   Physical Activity: Not on file   Stress: Not on file   Social Connections: Not on file   Intimate Partner Violence: Not on file   Housing Stability: Not on file        Current Outpatient Medications   Medication Sig Dispense Refill    levETIRAcetam (KEPPRA) 500 MG tablet Take 1 tablet by mouth in the morning and 1 tablet before bedtime. 60 tablet 1    levETIRAcetam (KEPPRA) 250 MG tablet Take 1 tablet by mouth 2 times daily 60 tablet 0     No current facility-administered medications for this visit. No Known Allergies     REVIEW OF SYSTEMS:     Review of Systems   Unable to review given patient was actively seizing. NEUROLOGICAL EXAMINATION:   VITALS  BP 95/67 (Site: Left Upper Arm, Position: Sitting, Cuff Size: Medium Adult)   Pulse 74   Ht 5' 3\" (1.6 m)   Wt 167 lb 6.4 oz (75.9 kg)   BMI 29.65 kg/m²      Patient was nonverbal upon my arrival.  Patient was able to follow some of my commands as showing Tavon into fingers patient was able to follow my finger with her eyes in all directions. Unable to do any further exam as patient was taken to the ER. ASSESSMENT / PLAN:   Nya Poe is a 25 y.o. female that established care with neurology for seizures. Patient had not been taking her seizure medication for a week prior to office visit today. Patient is also on Keppra 750 mg twice a day. Patient had a seizure prior to me seeing the patient and examining the patient. Patient sent to the emergency room for further evaluation. Of note patient was seen in the emergency department at [facility] in May 2022 and was to follow-up outpatient with a neurologist.  Patient will need a full epilepsy work-up as it is unclear if patient has epileptic or nonepileptic seizures.   She will likely be needed to be admitted for long-term EEG monitoring and taken off antiepileptics while in the hospital. She will also need MRI brain with and without contrast with epilepsy protocol.       Miguelina Demarco MD   Neurology & Sleep Medicine  [facility]

## 2022-08-17 NOTE — ED PROVIDER NOTES
[facility] ED  eMERGENCY dEPARTMENT eNCOUnter      Pt Name: Landon Hamman  MRN: [id number]  [place] 2000  Date of evaluation: 8/17/2022  Provider: ALFREDO Canada CNP    CHIEF COMPLAINT       Chief Complaint   Patient presents with    Seizures         HISTORY OF PRESENT ILLNESS  (Location/Symptom, Timing/Onset, Context/Setting, Quality, Duration, Modifying Factors, Severity.)   Landon Hamman is a 25 y.o. female who presents to the emergency department via EMS for a seizure today. She was at her first neurology appointment today when the seizure occurred. She started having seizures in March, 2022 and was started on Keppra. States she was advised by her pcp to stop taking her Keppra one week ago. States she was told to hold the medication until she saw the neurologist. She states this is her third seizure in the past few days. She did take 750 mg of Keppra today PTA. Denies fever, chills, HA, vision changes, weakness. Denies CP, cough, SOB. Denies N/V/D. Denies pain at this time. Nursing Notes were reviewed. ALLERGIES     Patient has no known allergies. CURRENT MEDICATIONS       Previous Medications    LEVETIRACETAM (KEPPRA) 250 MG TABLET    Take 1 tablet by mouth 2 times daily    LEVETIRACETAM (KEPPRA) 500 MG TABLET    Take 1 tablet by mouth in the morning and 1 tablet before bedtime. PAST MEDICAL HISTORY         Diagnosis Date    Eczema        SURGICAL HISTORY     History reviewed. No pertinent surgical history. FAMILY HISTORY     History reviewed. No pertinent family history. Family Status   Relation Name Status    Mother  Alive        SOCIAL HISTORY      reports that she has never smoked. She has never used smokeless tobacco. She reports that she does not drink alcohol and does not use drugs.     REVIEW OF SYSTEMS    (2-9 systems for level 4, 10 or more for level 5)     Review of Systems   Constitutional:  Negative for chills, diaphoresis, fatigue and fever.   HENT:  Negative for mouth sores. Eyes:  Negative for photophobia, pain and visual disturbance. Respiratory:  Negative for cough and shortness of breath. Cardiovascular:  Negative for chest pain. Gastrointestinal:  Negative for abdominal pain, diarrhea, nausea and vomiting. Genitourinary:  Negative for dysuria. Musculoskeletal:  Negative for arthralgias, back pain, myalgias and neck pain. Skin:  Negative for color change, rash and wound. Neurological:  Positive for seizures. Negative for dizziness, facial asymmetry, speech difficulty, weakness, light-headedness, numbness and headaches. Psychiatric/Behavioral:  Negative for confusion. Except as noted above the remainder of the review of systems was reviewed and negative. PHYSICAL EXAM    (up to 7 for level 4, 8 or more for level 5)     ED Triage Vitals   BP Temp Temp src Pulse Resp SpO2 Height Weight   -- -- -- -- -- -- -- --     Physical Exam  Vitals reviewed. Constitutional:       General: She is not in acute distress. Appearance: She is well-developed. She is not diaphoretic. HENT:      Head: No raccoon eyes. Nose:      Right Nostril: No epistaxis. Left Nostril: No epistaxis. Eyes:      General: No scleral icterus. Extraocular Movements: Extraocular movements intact. Conjunctiva/sclera: Conjunctivae normal.      Pupils: Pupils are equal, round, and reactive to light. Cardiovascular:      Rate and Rhythm: Normal rate. Pulmonary:      Effort: Pulmonary effort is normal. No respiratory distress. Breath sounds: No stridor. No wheezing. Musculoskeletal:      Cervical back: Neck supple. No rigidity. Comments: Moves extremities. Skin:     General: Skin is warm and dry. Findings: No rash. Neurological:      Mental Status: She is alert and oriented to person, place, and time. GCS: GCS eye subscore is 4. GCS verbal subscore is 5. GCS motor subscore is 6.       Cranial Nerves: Cranial nerves are intact. Motor: Motor function is intact. Coordination: Coordination is intact. Psychiatric:         Behavior: Behavior normal.          DIAGNOSTIC RESULTS     LABS:  Labs Reviewed   BASIC METABOLIC PANEL - Abnormal; Notable for the following components:       Result Value    BUN 4 (*)     Bun/Cre Ratio 5 (*)     Potassium 3.6 (*)     Anion Gap 8 (*)     All other components within normal limits   CBC WITH AUTO DIFFERENTIAL - Abnormal; Notable for the following components:    MCV 76.4 (*)     MCH 23.6 (*)     RDW 15.0 (*)     All other components within normal limits   LEVETIRACETAM LEVEL       All other labs were within normal range or not returned as of this dictation. EMERGENCY DEPARTMENT COURSE and DIFFERENTIAL DIAGNOSIS/MDM:   Vitals:    Vitals:    08/17/22 1231 08/17/22 1330 08/17/22 1400   BP: 100/72 101/71 94/63   Pulse: 58 55 53   Resp: 16 17 19   Temp: 98.3 °F (36.8 °C)     TempSrc: Oral     SpO2: 98% 98% 98%   Weight: 167 lb (75.8 kg)     Height: 5' 3\" (1.6 m)         CLINICAL DECISION MAKING:  The patient presented alert with a nontoxic appearance and was seen in conjunction with Dr. Karla Powell. She was sent to the ED today for evaluation and admission by her neurologist. Today was her first neurology appointment. An attempt was made to transfer the patient to [facility] due to neurology requesting the patient have long-term EEG monitoring. Neurology at [facility] requested the patient have a regular EEG and MRI here prior to transfer. Dr. Nuno Carlos told [facility] that he would evaluate the patient here. I spoke with Danyelle Jones CNP from [facility] for admission here at [facility]. Care was provided during an unprecedented national emergency due to the novel coronavirus, Covid-19. CONSULTS:  IP CONSULT TO NEUROLOGY  IP CONSULT TO INTERNAL MEDICINE        FINAL IMPRESSION      1.  Seizure-like activity [facility])            Problem List  Patient Active Problem List   Diagnosis Code    Acne vulgaris- no response to orals/retin a refer derm 10/17 L70.0    BMI (body mass index), pediatric, greater than or equal to 95% for age Z71.50    [facility] R56.9         DISPOSITION/PLAN   DISPOSITION Decision To Admit 08/17/2022 04:10:22 PM      PATIENT REFERRED TO:   No follow-up provider specified.     DISCHARGE MEDICATIONS:     New Prescriptions    No medications on file           (Please note that portions of this note were completed with a voice recognition program.  Efforts were made to edit the dictations but occasionally words are mis-transcribed.)    ALFREDO Houser - ALFREDO Ratliff CNP  08/17/22 1929

## 2022-08-17 NOTE — H&P
[facility]  Office: [address], DO, Helena Moy, DO, Tai Colunga, DO, Bernardcritsa Clayton Miller, DO, Luis Herrera MD, Ari Siddiqi MD, Darius Tony MD, Ricky Bermeo MD,  Niraj Burris MD, Jodie Taylor MD, Roselia Waters, DO, Karley Morillo MD,  Nato Broderick MD, Sydney Lama MD, Ashley Wallace, DO, Jim Gallagher MD, Johnson Shah MD, Jen Wallace MD, Sumanth Ruff DO, Jayme Yu MD, Yeyo Paulson MD, Sneha Caballero, CNP,  Roosevelt Saleh, CNP, Ravi Menchaca, CNP, Clemente Desir, DESTINY, Magdalena Velázquez PA-C, Amrita Simmons, LAURA, Georgi Lovelace, CNP, Diamond Brooks, CNP, Chris Hay, CNP, Skye Jaime, CNP, Kristy Guillen, CNP, Marcela Tejada, CNS, Parveen Arizmendi, DNP, Sissy Gracia, CNP, Cathie Sears, CNP, Anya Noriega, CNP           [facility]    HISTORY AND PHYSICAL EXAMINATION            Date:   8/17/2022  Patient name:  Caleb Martino  Date of admission:  8/17/2022 12:26 PM  MRN:   [id number]  Account:  [de-identified]  YOB: 2000  PCP:    Boubacar Israel PA-C  Room:   [place] [address]  Code Status:    No Order    Chief Complaint:     Chief Complaint   Patient presents with    Seizures       History Obtained From:     patient, electronic medical record    History of Present Illness:     Caleb Martino is a 25 y.o. Non- / non  female who presents with Seizures   and is admitted to the hospital for the management of Breakthrough seizure ([facility] [address] The patient went to a neurology follow-up today related to a new diagnosis of seizures that started in May of this year. According to the notes when the neurologist entered the room the patient was laying on her side and unable to speak. He states no shaking was noted but it was a presumed seizure. He states her arms stiffened up but she was able to follow commands.   According to the patient her PCP told her to stop taking her Keppra 1 week prior to her neurology visit. She states her first seizure was in May of this year and she continues to have them intermittently. She states she is very sensitive to being hot or to bright lights. She thinks she probably has a least 1 or 2 seizures a month but is really dependent on how she is feeling and her surroundings. She denies a history of seizures as a child, head injury, or family history of seizures/epilepsy. She does report having COVID in March but otherwise her health has been pretty good. During my assessment she is alert and oriented x3 with no neurodeficits noted. Neurology has been consulted. According to the neurology note from earlier in the day he felt the patient needed to be admitted for a long-term EEG, taken off her antibiotics while in the hospital, and an MRI brain with and without contrast with epilepsy protocol. Past Medical History:     Past Medical History:   Diagnosis Date    Eczema     Seizure ([facility] [address]     march 22        Past Surgical History:     History reviewed. No pertinent surgical history. Medications Prior to Admission:     Prior to Admission medications    Medication Sig Start Date End Date Taking? Authorizing Provider   levETIRAcetam (KEPPRA) 500 MG tablet Take 1 tablet by mouth in the morning and 1 tablet before bedtime. 8/9/22 9/8/22  Adarsh Crowe PA-C   levETIRAcetam (KEPPRA) 250 MG tablet Take 1 tablet by mouth 2 times daily 6/30/22   Adarsh Crowe PA-C        Allergies:     Patient has no known allergies. Social History:     Tobacco:    reports that she has never smoked. She has never used smokeless tobacco.  Alcohol:      reports no history of alcohol use. Drug Use:  reports no history of drug use. Family History:     Family History   Problem Relation Age of Onset    Diabetes Mother     No Known Problems Father        Review of Systems:     Positive and Negative as described in HPI.     Review of Systems   Neurological: Positive for seizures. All other systems reviewed and are negative. Physical Exam:   BP 93/66   Pulse 56   Temp 98.3 °F (36.8 °C) (Oral)   Resp 17   Ht 5' 3\" (1.6 m)   Wt 167 lb (75.8 kg)   LMP 08/10/2022   SpO2 98%   BMI 29.58 kg/m²   Temp (24hrs), Av.3 °F (36.8 °C), Min:98.3 °F (36.8 °C), Max:98.3 °F (36.8 °C)    No results for input(s): POCGLU in the last 72 hours. No intake or output data in the 24 hours ending 22 184    Physical Exam  Vitals and nursing note reviewed. Constitutional:       Appearance: Normal appearance. Eyes:      Extraocular Movements: Extraocular movements intact. Pupils: Pupils are equal, round, and reactive to light. Cardiovascular:      Rate and Rhythm: Normal rate. Pulses: Normal pulses. Pulmonary:      Effort: Pulmonary effort is normal.   Abdominal:      General: Bowel sounds are normal.   Musculoskeletal:         General: Normal range of motion. Skin:     General: Skin is warm and dry. Capillary Refill: Capillary refill takes less than 2 seconds. Neurological:      General: No focal deficit present. Mental Status: She is alert and oriented to person, place, and time. GCS: GCS eye subscore is 4. GCS verbal subscore is 5. GCS motor subscore is 6. Sensory: Sensation is intact. Motor: Motor function is intact. Investigations:      Laboratory Testing:  Recent Results (from the past 24 hour(s))   BMP    Collection Time: 22 12:51 PM   Result Value Ref Range    Glucose 90 70 - 99 mg/dL    BUN 4 (L) 6 - 20 mg/dL    Creatinine 0.80 0.50 - 0.90 mg/dL    Bun/Cre Ratio 5 (L) 9 - 20    Calcium 8.9 8.6 - 10.4 mg/dL    Sodium 138 135 - 144 mmol/L    Potassium 3.6 (L) 3.7 - 5.3 mmol/L    Chloride 102 98 - 107 mmol/L    CO2 28 20 - 31 mmol/L    Anion Gap 8 (L) 9 - 17 mmol/L    GFR Non-African American  >60 mL/min     Pediatric GFR requires additional information. Refer to [facility] website for calculator.     GFR Comment CBC with Auto Differential    Collection Time: 08/17/22 12:51 PM   Result Value Ref Range    WBC 8.5 4.5 - 13.5 k/uL    RBC 5.09 3.95 - 5.11 m/uL    Hemoglobin 12.0 11.9 - 15.1 g/dL    Hematocrit 38.9 36.3 - 47.1 %    MCV 76.4 (L) 82.6 - 102.9 fL    MCH 23.6 (L) 25.2 - 33.5 pg    MCHC 30.8 28.4 - 34.8 g/dL    RDW 15.0 (H) 11.8 - 14.4 %    Platelets 244 180 - 484 k/uL    MPV 11.2 8.1 - 13.5 fL    NRBC Automated 0.0 0.0 per 100 WBC    Seg Neutrophils 63 34 - 64 %    Lymphocytes 31 25 - 45 %    Atypical Lymphocytes 2 %    Monocytes 3 2 - 8 %    Eosinophils % 1 1 - 4 %    Basophils 0 %    Immature Granulocytes 0 0 %    Segs Absolute 5.34 1.8 - 8.0 k/uL    Absolute Lymph # 2.64 1.2 - 5.2 k/uL    Atypical Lymphocytes Absolute 0.17 k/uL    Absolute Mono # 0.26 0.2 - 0.8 k/uL    Absolute Eos # 0.09 0.0 - 0.4 k/uL    Basophils Absolute 0.00 0.0 - 0.2 k/uL    Absolute Immature Granulocyte 0.00 0.00 - 0.30 k/uL   Levetiracetam Level    Collection Time: 08/17/22 12:51 PM   Result Value Ref Range    Levetiracetam Lvl 5 ug/mL       Imaging/Diagnostics:  No results found.     Assessment :      Hospital Problems             Last Modified POA    * (Principal) Breakthrough seizure ([facility] [address] 8/17/2022 Yes       Plan:     Patient status inpatient in the  Progressive Unit/Step down    Breakthrough seizure  MRI brain with and without-epileptic protocol per recent neurology note  Hold antiepileptics until seen by neurology-Per recent neurology note  Seizure and fall precautions  Ativan as needed for breakthrough seizure  Neurochecks every 4 hours    Monitor and replace electrolytes as needed        DVT prophylaxis    Consultations:   IP CONSULT TO INTERNAL MEDICINE  IP CONSULT TO NEUROLOGY     Patient is admitted as inpatient status because of co-morbidities listed above, severity of signs and symptoms as outlined, requirement for current medical therapies and most importantly because of direct risk to patient if care not provided in a hospital setting. Expected length of stay > 48 hours.     ALFREDO Rose - CNP  8/17/2022  6:42 PM    Copy sent to Dr. Negin Monge PA-C

## 2022-08-18 VITALS
DIASTOLIC BLOOD PRESSURE: 63 MMHG | RESPIRATION RATE: 14 BRPM | WEIGHT: 167 LBS | HEIGHT: 63 IN | HEART RATE: 54 BPM | OXYGEN SATURATION: 99 % | TEMPERATURE: 98.6 F | SYSTOLIC BLOOD PRESSURE: 102 MMHG | BODY MASS INDEX: 29.59 KG/M2

## 2022-08-18 PROBLEM — E87.6 HYPOKALEMIA: Status: ACTIVE | Noted: 2022-08-18

## 2022-08-18 LAB
ANION GAP SERPL CALCULATED.3IONS-SCNC: 8 MMOL/L (ref 9–17)
BUN BLDV-MCNC: 7 MG/DL (ref 6–20)
BUN/CREAT BLD: 9 (ref 9–20)
CALCIUM SERPL-MCNC: 8.8 MG/DL (ref 8.6–10.4)
CHLORIDE BLD-SCNC: 104 MMOL/L (ref 98–107)
CO2: 27 MMOL/L (ref 20–31)
CREAT SERPL-MCNC: 0.82 MG/DL (ref 0.5–0.9)
GFR NON-AFRICAN AMERICAN: ABNORMAL ML/MIN
GFR SERPL CREATININE-BSD FRML MDRD: ABNORMAL ML/MIN/{1.73_M2}
GLUCOSE BLD-MCNC: 95 MG/DL (ref 70–99)
INR BLD: 1.1
MAGNESIUM: 1.9 MG/DL (ref 1.7–2.2)
POTASSIUM SERPL-SCNC: 3.6 MMOL/L (ref 3.7–5.3)
PROTHROMBIN TIME: 14 SEC (ref 11.5–14.2)
SODIUM BLD-SCNC: 139 MMOL/L (ref 135–144)

## 2022-08-18 PROCEDURE — 99232 SBSQ HOSP IP/OBS MODERATE 35: CPT | Performed by: INTERNAL MEDICINE

## 2022-08-18 PROCEDURE — 6370000000 HC RX 637 (ALT 250 FOR IP): Performed by: NURSE PRACTITIONER

## 2022-08-18 PROCEDURE — 95816 EEG AWAKE AND DROWSY: CPT

## 2022-08-18 PROCEDURE — 83735 ASSAY OF MAGNESIUM: CPT

## 2022-08-18 PROCEDURE — 95819 EEG AWAKE AND ASLEEP: CPT | Performed by: PSYCHIATRY & NEUROLOGY

## 2022-08-18 PROCEDURE — 36415 COLL VENOUS BLD VENIPUNCTURE: CPT

## 2022-08-18 PROCEDURE — G0378 HOSPITAL OBSERVATION PER HR: HCPCS

## 2022-08-18 PROCEDURE — 80048 BASIC METABOLIC PNL TOTAL CA: CPT

## 2022-08-18 PROCEDURE — 99253 IP/OBS CNSLTJ NEW/EST LOW 45: CPT | Performed by: PSYCHIATRY & NEUROLOGY

## 2022-08-18 PROCEDURE — 85610 PROTHROMBIN TIME: CPT

## 2022-08-18 PROCEDURE — 2580000003 HC RX 258: Performed by: NURSE PRACTITIONER

## 2022-08-18 RX ADMIN — ACETAMINOPHEN 650 MG: 325 TABLET, FILM COATED ORAL at 10:01

## 2022-08-18 RX ADMIN — ACETAMINOPHEN 650 MG: 325 TABLET, FILM COATED ORAL at 15:45

## 2022-08-18 RX ADMIN — SODIUM CHLORIDE, PRESERVATIVE FREE 10 ML: 5 INJECTION INTRAVENOUS at 07:45

## 2022-08-18 RX ADMIN — ACETAMINOPHEN 650 MG: 325 TABLET, FILM COATED ORAL at 04:04

## 2022-08-18 ASSESSMENT — PAIN SCALES - GENERAL
PAINLEVEL_OUTOF10: 4
PAINLEVEL_OUTOF10: 4
PAINLEVEL_OUTOF10: 0
PAINLEVEL_OUTOF10: 8

## 2022-08-18 ASSESSMENT — PAIN DESCRIPTION - DESCRIPTORS
DESCRIPTORS: ACHING
DESCRIPTORS: ACHING

## 2022-08-18 ASSESSMENT — PAIN DESCRIPTION - LOCATION
LOCATION: HEAD

## 2022-08-18 ASSESSMENT — PAIN - FUNCTIONAL ASSESSMENT: PAIN_FUNCTIONAL_ASSESSMENT: ACTIVITIES ARE NOT PREVENTED

## 2022-08-18 NOTE — PROGRESS NOTES
All patient belongings collected. IV and telemetry removed. Discharge paperwork given and explained to patient, guardian Edin Arenas called and [address] went over discharge instructions with her as well. See discharge event for further details.

## 2022-08-18 NOTE — PROGRESS NOTES
Occupational Therapy    DATE: 2022    NAME: Audrey Aguilar  MRN: [id number]   : 2000    Patient not seen this date for Occupational Therapy due to:      [] Cancel by RN or physician due to:    [] Hemodialysis    [] Critical Lab Value Level     [] Blood transfusion in progress    [] Acute or unstable cardiovascular status   _MAP < 55 or more than >115  _HR < 40 or > 130    [] Acute or unstable pulmonary status   -FiO2 > 60%   _RR < 5 or >40    _O2 sats < 85%    [] Strict Bedrest    [] Off Unit for surgery or procedure    [] Off Unit for testing       [] Pending imaging to R/O fracture    [] Refusal by Patient      [] Other      [x] OT being discontinued at this time. Patient independent. No further needs.      Nelly Spivey OTR/L

## 2022-08-18 NOTE — PLAN OF CARE
Problem: Neurosensory - Adult  Goal: Remains free of injury related to seizures activity  Outcome: Progressing

## 2022-08-18 NOTE — DISCHARGE SUMMARY
[facility]  Office: [address], DO, Candida Tiffany, DO, Paulettemonalisa Clark, DO, Caroline Brooksjaja Miller, DO, Sharon Kruse MD, Momo Foster MD, Suni Cordero MD, Melissa Huynh MD,  Andrew Durán MD, Rick Yu MD, Swati Tran, DO, Padilla Plascencia MD,  Ellyn Ray MD, Christine Martins MD, Stephane Villegas, DO, Sriram Mujica MD, Bruce Valdovinos MD, Hilario Madsen MD, Roshni Dutton, DO, Suzie Pena MD, Dietrich Halsted, MD, Vandana Lim, CNP,  Paris Samuel, CNP, Myles Martinez, CNP, Lorraine Mccallum, CNP, Lyssa Guidry PA-C, Ozzie Jurado, [facility], Sharan Pinedo, CNP, Sujata Loco, CNP, Rhea Lawrence, CNP, Aniyah Juarez, CNP, Carolynn Hawkins, CNP, Swetha Pope, CNS, Raquel You, [facility], Carmine Pope, CNP, Mel Stewart, CNP, Barrett Varela, [facility]    Discharge Summary     Patient ID: Lavera Cabot  :  2000   MRN: [id number]     ACCOUNT:  [de-identified]   Patient's PCP: Jael Solomon PA-C  Admit Date: 2022   Discharge Date: 2022    Length of Stay: 1  Code Status:  Full Code  Admitting Physician: Natasha Rajput MD  Discharge Physician: Natasha Rajput MD     Active Discharge Diagnoses:     Hospital Problem Lists:  Principal Problem:    Breakthrough seizure [facility])  Active Problems:    Hypokalemia  Resolved Problems:    * No resolved hospital problems. *      Admission Condition:  fair     Discharged Condition: good    Hospital Stay:     Hospital Course:  Lavera Cabot is a 25 y.o. female who was admitted for the management of  Breakthrough seizure [facility]) , presented to ER with Seizures    Per the NP:  Berry Tatum is a 25 y.o. Non- / non  female who presents with Seizures   and is admitted to the hospital for the management of Breakthrough seizure ([address]      The patient went to a neurology follow-up today related to a new diagnosis of seizures that started Final Specialist Recommendations/Findings:   IP CONSULT TO INTERNAL MEDICINE  IP CONSULT TO NEUROLOGY      The patient was seen and examined on day of discharge and this discharge summary is in conjunction with any daily progress note from day of discharge. Discharge plan:     Disposition: Home    Physician Follow Up:     [id number] [place] [address] [phone]- PCP in 1 week    Neurology 1-2 weeks      Diet: regular diet    Activity: As tolerated    Instructions to Patient: No driving, seizure precautions    Discharge Medications:      Medication List        CONTINUE taking these medications      * levETIRAcetam 250 MG tablet  Commonly known as: Keppra  Take 1 tablet by mouth 2 times daily     * levETIRAcetam 500 MG tablet  Commonly known as: KEPPRA  Take 1 tablet by mouth in the morning and 1 tablet before bedtime. * This list has 2 medication(s) that are the same as other medications prescribed for you. Read the directions carefully, and ask your doctor or other care provider to review them with you. No discharge procedures on file. Time Spent on discharge is  20 mins in patient examination, evaluation, counseling as well as medication reconciliation, prescriptions for required medications, discharge plan and follow up. Electronically signed by   Magnolia Alves MD  8/18/2022  5:22 PM      Thank you Dr. Braden Quick PA-C for the opportunity to be involved in this patient's care.

## 2022-08-18 NOTE — PLAN OF CARE
Problem: Safety - Adult  Goal: Free from fall injury  8/18/2022 0754 by Louisa Maciel RN  Outcome: Progressing     Problem: Discharge Planning  Goal: Discharge to home or other facility with appropriate resources  8/18/2022 0754 by Louisa Maciel RN  Outcome: Progressing     Problem: Neurosensory - Adult  Goal: Absence of seizures  8/18/2022 0754 by Louisa Maciel RN  Outcome: Progressing  Flowsheets (Taken 8/18/2022 2595)  Absence of seizures: Monitor for seizure activity.   If seizure occurs, document type and location of movements and any associated apnea

## 2022-08-18 NOTE — CONSULTS
Neurology Consult Note        Reason for Consult:  seizures  Requesting Physician:  Dr Kathy Grewal:  seizures    History Obtained From:  patient, electronic medical record       HISTORY OF PRESENT ILLNESS:    This is a 25 y.o. AAF who presented with reported seizures     The patient went to a neurology follow-up appointment yesterday for evaluation of a suspected new diagnosis of seizures that started in May of this year. According to the notes when the neurologist entered the room the patient was laying on her side and unable to speak. He states no shaking was noted but it was a presumed seizure. He states her arms stiffened up but she was able to follow commands. According to the patient her PCP told her to stop taking her Keppra 1 week prior to her neurology visit. She states her first seizure was in May of this year and she continues to have them intermittently. She states she is very sensitive to being hot or to bright lights. She thinks she probably has a least 1 or 2 seizures a month but is really dependent on how she is feeling and her surroundings. She denies a history of seizures as a child, head injury, or family history of seizures/epilepsy. She does report having COVID in March but otherwise her health has been pretty good. During my assessment she is alert and oriented x3 with no neurodeficits noted. She also reports some spells that she can feel coming on and which are associated with \"dizziness\" and spots in her vision and sometimes these have gone to passing out episodes and at other times the longer events. Past Medical History:        Diagnosis Date    Eczema     Seizure ([facility] [address]     march 22     Past Surgical History:    History reviewed. No pertinent surgical history.   Current Medications:   Current Facility-Administered Medications: LORazepam (ATIVAN) injection 1 mg, 1 mg, IntraVENous, Q5 Min PRN  sodium chloride flush 0.9 % injection 5-40 mL, 5-40 mL, IntraVENous, 2 times per day  sodium chloride flush 0.9 % injection 10 mL, 10 mL, IntraVENous, PRN  0.9 % sodium chloride infusion, , IntraVENous, PRN  potassium chloride (KLOR-CON M) extended release tablet 40 mEq, 40 mEq, Oral, PRN **OR** potassium bicarb-citric acid (EFFER-K) effervescent tablet 40 mEq, 40 mEq, Oral, PRN **OR** potassium chloride 10 mEq/100 mL IVPB (Peripheral Line), 10 mEq, IntraVENous, PRN  magnesium sulfate 1000 mg in dextrose 5% 100 mL IVPB, 1,000 mg, IntraVENous, PRN  enoxaparin (LOVENOX) injection 40 mg, 40 mg, SubCUTAneous, Daily  ondansetron (ZOFRAN-ODT) disintegrating tablet 4 mg, 4 mg, Oral, Q8H PRN **OR** ondansetron (ZOFRAN) injection 4 mg, 4 mg, IntraVENous, Q6H PRN  polyethylene glycol (GLYCOLAX) packet 17 g, 17 g, Oral, Daily PRN  acetaminophen (TYLENOL) tablet 650 mg, 650 mg, Oral, Q6H PRN **OR** acetaminophen (TYLENOL) suppository 650 mg, 650 mg, Rectal, Q6H PRN  Allergies:  Patient has no known allergies. Social History:  TOBACCO:   reports that she has never smoked. She has never used smokeless tobacco.  ETOH:   reports no history of alcohol use. DRUGS:   reports no history of drug use. Family History:       Problem Relation Age of Onset    Diabetes Mother     No Known Problems Father        REVIEW OF SYSTEMS:  Positive and Negative as described in HPI. Review of Systems  Neurological:  Positive for seizures. All other systems reviewed and are negative.     PHYSICAL EXAM:    Vitals:  /63   Pulse 54   Temp 98.6 °F (37 °C) (Oral)   Resp 14   Ht 5' 3\" (1.6 m)   Wt 167 lb (75.8 kg)   LMP 08/10/2022   SpO2 99%   BMI 29.58 kg/m²      General Exam:  Normal body habitus, no acute distress    HEENT:  Normocephalic, atraumatic  Eyes: conjunctiva non-injected, sclera anicteric  Mucous membranes of normal color and hydration status  No evidence of tongue biting    Neurologic exam:     Mental status: alert and oriented to person, place, time and situation  No overt visuospatial neglect or gaze preference  Language with normal fluency and comprehension to simple commands. Cranial nerves:  Pupils equal round and reactive to light, no eyelid ptosis  Extraocular movements: intact and full. Face is symmetric to movement and sensation  Hearing is intact to conversation  Tongue midline, no dysarthria or dysphonia    Motor exam:  Strength   (Right/Left)  Deltoids           5/5  Biceps             5/5  Triceps            5/5  Wrist ext          5/5  Finger ext        5/5  Hand intrin       5/5    Hip flex            5/5  Knee ext          5/5  Ankle dorsi       5/5      DTRs           (right/left)  Biceps                 2/2  Brachorad           2/2  Patellar                2/2  Ankles                 2/2    Coordination  Finger nose finger intact bilaterally    Sensation: intact to LT and vibratory sense in hands and feet    Romberg; negative    No pronator drift    Gait: normal native        DATA  Imaging and labs and EEG reviewed    IMPRESSION:   This is a 24 y/o AAF with reported intermittent seizures since May. These episodes do not seem entirely stereotyped and some episodes sound more like syncope. Certainly the description as reported by the neurologist at her appt suggests a very unusual type of seizure. Her neurologic exam, MRI and EEG are normal. 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 She reports that she had less seizures on Keppra and I have no objections to her being discharged on Keppra but ultimately she may require evaluation is an epilepsy monitoring unit to sort this all out. RECOMMENDATIONS:   No further work-up as inpatient  2. Can restart on Keppra at 750mg BID  3, She needs to return to her neurologist for further work-up  4. She should continue to practice seizure precautions including driving restrictions while her work-up is ongoing. Flako Lawson. Jordan Abdi M.D.   Clinical Neurophysiologist  Neuromuscular Medicine

## 2022-08-18 NOTE — PROGRESS NOTES
Pt admitted to room 1011. Vitals and assessment completed as charted. Pt oriented to room and call light. All questions answered.

## 2022-08-18 NOTE — PROGRESS NOTES
[facility]  Office: [address], DO, Yashira Zapata, DO, Ruth Alfaro, DO, Marie Miller, DO, Heidy Banks MD, Brenna Shea MD, Renay Mixon MD, Claudean Millard, MD,  Raquel Rothman MD, Brook Jones MD, Jennifer Winn, DO, Shelton Arauz MD,  Silvina Ramsay MD, Bonny Wick MD, Verla Kawasaki, DO, Rashad Fowler MD, Tereza Watters MD, Miladis Solares MD, Jhonatan Espinoza, DO, Mark Hall MD, Holger Nunez MD, Concepcion Edmonds, CNP,  Norma Duenas, CNP, Lo Suazo CNP, Boubacar Young, CNP, Flores Rodriguez PA-C, Kaila Barrett, [facility], Dariana Cartwright, CNP, Colby Ordonez, CNP, Taylor Forbes, CNP, Tim Saint, CNP, Alcides Faith, CNP, Claudio Baumann, CNS, Tre Chao, [facility], Jacob Upton, CNP, Markie Simons, CNP, Autumn Avila, [facility]    Progress Note    8/18/2022    12:06 PM    Name:   Bryson Cuenca  MRN:     [id number]     Acct:      [de-identified]   Room:   1011/1011-02   Day:  1  Admit Date:  8/17/2022 12:26 PM    PCP:   Susi Hagen PA-C  Code Status:  Full Code    Subjective:     C/C:   Chief Complaint   Patient presents with    Seizures     Interval History Status: not changed. Pt is resting in bed. No seizures overnight. She does c/o a HA. She had the MRI brain yesterday. Brief History:     Per the NP:  Gennaro Walker is a 25 y.o. Non- / non  female who presents with Seizures   and is admitted to the hospital for the management of Breakthrough seizure ([facility] [address] The patient went to a neurology follow-up today related to a new diagnosis of seizures that started in May of this year. According to the notes when the neurologist entered the room the patient was laying on her side and unable to speak. He states no shaking was noted but it was a presumed seizure. He states her arms stiffened up but she was able to follow commands.   According to the patient her PCP told her to stop taking her Keppra 1 week prior to her neurology visit. She states her first seizure was in May of this year and she continues to have them intermittently. She states she is very sensitive to being hot or to bright lights. She thinks she probably has a least 1 or 2 seizures a month but is really dependent on how she is feeling and her surroundings. She denies a history of seizures as a child, head injury, or family history of seizures/epilepsy. She does report having COVID in March but otherwise her health has been pretty good. During my assessment she is alert and oriented x3 with no neurodeficits noted. Neurology has been consulted. According to the neurology note from earlier in the day he felt the patient needed to be admitted for a long-term EEG, taken off her antibiotics while in the hospital, and an MRI brain with and without contrast with epilepsy protocol. \"    Review of Systems:     Constitutional:  negative for chills, fevers, sweats  Respiratory:  negative for cough, dyspnea on exertion, shortness of breath, wheezing  Cardiovascular:  negative for chest pain, chest pressure/discomfort, lower extremity edema, palpitations  Gastrointestinal:  negative for abdominal pain, constipation, diarrhea, nausea, vomiting  Neurological:  negative for dizziness, + headache    Medications: Allergies:  No Known Allergies    Current Meds:   Scheduled Meds:    sodium chloride flush  5-40 mL IntraVENous 2 times per day    enoxaparin  40 mg SubCUTAneous Daily     Continuous Infusions:    sodium chloride       PRN Meds: LORazepam, sodium chloride flush, sodium chloride, potassium chloride **OR** potassium alternative oral replacement **OR** potassium chloride, magnesium sulfate, ondansetron **OR** ondansetron, polyethylene glycol, acetaminophen **OR** acetaminophen    Data:     Past Medical History:   has a past medical history of Eczema and Seizure ([facility] [address]     Social History: reports that she has never smoked. She has never used smokeless tobacco. She reports that she does not drink alcohol and does not use drugs. Family History:   Family History   Problem Relation Age of Onset    Diabetes Mother     No Known Problems Father        Vitals:  BP (!) 101/58   Pulse 56   Temp 98.1 °F (36.7 °C) (Oral)   Resp 15   Ht 5' 3\" (1.6 m)   Wt 167 lb (75.8 kg)   LMP 08/10/2022   SpO2 99%   BMI 29.58 kg/m²   Temp (24hrs), Av.1 °F (36.7 °C), Min:97.7 °F (36.5 °C), Max:98.4 °F (36.9 °C)    No results for input(s): POCGLU in the last 72 hours. I/O (24Hr): Intake/Output Summary (Last 24 hours) at 2022 1206  Last data filed at 2022 1012  Gross per 24 hour   Intake 960 ml   Output 200 ml   Net 760 ml       Labs:  Hematology:  Recent Labs     22  1251 22  0458   WBC 8.5  --    RBC 5.09  --    HGB 12.0  --    HCT 38.9  --    MCV 76.4*  --    MCH 23.6*  --    MCHC 30.8  --    RDW 15.0*  --      --    MPV 11.2  --    INR  --  1.1     Chemistry:  Recent Labs     22  1251 22  0458    139   K 3.6* 3.6*    104   CO2 28 27   GLUCOSE 90 95   BUN 4* 7   CREATININE 0.80 0.82   MG  --  1.9   ANIONGAP 8* 8*   LABGLOM Pediatric GFR requires additional information. Refer to [facility] website for calculator. Pediatric GFR requires additional information. Refer to [facility] website for calculator. CALCIUM 8.9 8.8   No results for input(s): PROT, LABALBU, LABA1C, T2PASZN, L7FTMDI, FT4, TSH, AST, ALT, LDH, GGT, ALKPHOS, LABGGT, BILITOT, BILIDIR, AMMONIA, AMYLASE, LIPASE, LACTATE, CHOL, HDL, LDLCHOLESTEROL, CHOLHDLRATIO, TRIG, VLDL, NZF26JA, PHENYTOIN, PHENYF, URICACID, POCGLU in the last 72 hours.   ABG:No results found for: POCPH, PHART, PH, POCPCO2, YYN5CQV, PCO2, POCPO2, PO2ART, PO2, POCHCO3, XTB6BHW, HCO3, NBEA, PBEA, BEART, BE, THGBART, THB, RCA9CSG, YCDX8IHO, G4BKGUVN, O2SAT, FIO2  Lab Results   Component Value Date/Time    SPECIAL NOT REPORTED 01/18/2018 09:10 PM     Lab Results   Component Value Date/Time    CULTURE ORAL JOCELYNE PRESENT 01/18/2018 09:10 PM    CULTURE NEGATIVE FOR GROUP A STREPTOCOCCI 01/18/2018 09:10 PM    CULTURE  01/18/2018 09:10 PM     [employer] [address] [facility], [address] [place] [address] [phone]       Radiology:  MRI BRAIN W WO CONTRAST    Result Date: 8/17/2022  Somewhat artifact degraded exam. No acute abnormality seen, to the extent visualized. RECOMMENDATIONS: Unavailable       Physical Examination:        General appearance:  alert, cooperative and no distress  Mental Status:  oriented to person, place and time and normal affect  Lungs:  clear to auscultation bilaterally, normal effort  Heart:  regular rate and rhythm, no murmur  Abdomen:  soft, nontender, nondistended, normal bowel sounds, no masses, hepatomegaly, splenomegaly  Extremities:  no edema, redness, tenderness in the calves  Skin:  no gross lesions, rashes, induration    Assessment:        Hospital Problems             Last Modified POA    * (Principal) Breakthrough seizure ([address] 8/17/2022 Yes    Hypokalemia 8/18/2022 Yes       Plan:        Seizures- Someone at her PCPs office told her to hold Keppra 1 week prior to seeing her neurologist.  Mri brain- negative, EEG pending, Neurology eval pending, seizure precautions.  Avoids bright light  Hypokalemia- replace  DVT proph  Cancel PT/OT    Shannan Reardon MD  8/18/2022  12:06 PM

## 2022-08-18 NOTE — PROGRESS NOTES
[facility] Pharmacy Service   Medication Review    The patient's list of current home medications has been reviewed. Source(s) of information: spoke to patient and sure scripts     Based on information provided by the above source(s), no changes to the patient's home medication list were necessary. I changed or updated the following medications on the patient's home medication list:  Discontinued None      Added None      Adjusted   None      Other Notes None            Please feel free to call me with any questions about this encounter. Thank you. This note will be reviewed and co-signed by the [facility] Pharmacist. The pharmacist will review inpatient orders and contact the physician about any discrepancies. Aimee Longo, pharmacy technician  [facility]  Phone:  [phone]  Fax: [phone]      Electronically signed by Aimee Longo on 8/18/2022 at 12:29 PM         Prior to Admission medications    Medication Sig   levETIRAcetam (KEPPRA) 500 MG tablet Take 1 tablet by mouth in the morning and 1 tablet before bedtime. levETIRAcetam (KEPPRA) 250 MG tablet Take 1 tablet by mouth in the morning and 1 tablet before bedtime.

## 2022-08-18 NOTE — PROCEDURES
PATIENT:   Jenny Ferreira  DICTATING PHYSICIAN:  Per Sparrow MD    TECHNIQUE:  22 channels of EEG and 1 channel of EKG were recorded utilizing the International 10/20 System on a patient described as awake and cooperative. CLINICAL HISTORY: This is a 25 y.o. female with The encounter diagnosis was Seizure-like activity ([facility] [address] .  EEG is being performed to evaluate for seizure activity. Medications: Scheduled Meds:   sodium chloride flush  5-40 mL IntraVENous 2 times per day    enoxaparin  40 mg SubCUTAneous Daily         EEG FINDINGS:     The waking background activity consists of a symmetric and normally reactive 10 Hz, 40-60uV posteriorly predominant rhythm. Other findings: No areas of focal slowing were noted. No paroxysmal features or epileptiform activity was noted. Activating procedures:   Hyperventilation: Normal response  Photic stimulation: Normal response    Sleep: Normal stage 1 and stage 2 sleep were noted. IMPRESSION:  This is a normal EEG. Mylene Weldon M.D.   Clinical neurophysiologist

## 2022-08-18 NOTE — PROGRESS NOTES
Physical Therapy  DATE: 2022    NAME: John Perez  MRN: [id number]   : 2000    Patient not seen this date for Physical Therapy due to:      [] Cancel by RN or physician due to:    [] Hemodialysis    [] Critical Lab Value Level     [] Blood transfusion in progress    [] Acute or unstable cardiovascular status   _MAP < 55 or more than >115  _HR < 40 or > 130    [] Acute or unstable pulmonary status   -FiO2 > 60%   _RR < 5 or >40    _O2 sats < 85%    [] Strict Bedrest    [] Off Unit for surgery or procedure    [] Off Unit for testing       [] Pending imaging to R/O fracture    [] Refusal by Patient      [x] Other (Pt states she is fully independent, does not require PT intervention at this time)     [] PT being discontinued at this time. Patient independent. No further needs. [] PT being discontinued at this time as the patient has been transferred to hospice care. No further needs.       Jarrell Betancourt, PT   09:21

## 2022-08-19 NOTE — ED PROVIDER NOTES
eMERGENCY dEPARTMENT eNCOUnter   Independent Attestation     Pt Name: Alma Valencia  MRN: [id number]  [place] 2000  Date of evaluation: 8/19/22     Alma Valencia is a 25 y.o. female with CC: Seizures        This visit was performed by both a physician and an APC. I performed all aspects of the MDM as documented. The care is provided during an unprecedented national emergency due to the novel coronavirus, COVID 19.     Kaycee Mckinney MD  Attending Emergency Physician              Kaycee Mckinney MD  08/19/22 2463

## 2022-11-28 ENCOUNTER — HOSPITAL ENCOUNTER (OUTPATIENT)
Age: 22
Setting detail: SPECIMEN
Discharge: HOME | End: 2022-11-28

## 2022-11-28 ENCOUNTER — OFFICE VISIT (OUTPATIENT)
Dept: FAMILY MEDICINE CLINIC | Age: 22
End: 2022-11-28
Payer: COMMERCIAL

## 2022-11-28 VITALS
DIASTOLIC BLOOD PRESSURE: 64 MMHG | SYSTOLIC BLOOD PRESSURE: 99 MMHG | OXYGEN SATURATION: 98 % | TEMPERATURE: 98.5 F | HEART RATE: 78 BPM

## 2022-11-28 DIAGNOSIS — Z20.2 POSSIBLE EXPOSURE TO STD: ICD-10-CM

## 2022-11-28 DIAGNOSIS — N30.01 ACUTE CYSTITIS WITH HEMATURIA: Primary | ICD-10-CM

## 2022-11-28 LAB
BILIRUBIN, POC: ABNORMAL
BLOOD URINE, POC: ABNORMAL
CLARITY, POC: CLEAR
COLOR, POC: YELLOW
CONTROL: NORMAL
GLUCOSE URINE, POC: ABNORMAL
KETONES, POC: ABNORMAL
LEUKOCYTE EST, POC: ABNORMAL
NITRITE, POC: POSITIVE
PH, POC: 5.5
PREGNANCY TEST URINE, POC: NEGATIVE
PROTEIN, POC: ABNORMAL
SPECIFIC GRAVITY, POC: 1.02
UROBILINOGEN, POC: 0.2

## 2022-11-28 PROCEDURE — 81025 URINE PREGNANCY TEST: CPT | Performed by: NURSE PRACTITIONER

## 2022-11-28 PROCEDURE — 99213 OFFICE O/P EST LOW 20 MIN: CPT | Performed by: NURSE PRACTITIONER

## 2022-11-28 PROCEDURE — 81003 URINALYSIS AUTO W/O SCOPE: CPT | Performed by: NURSE PRACTITIONER

## 2022-11-28 PROCEDURE — 1036F TOBACCO NON-USER: CPT | Performed by: NURSE PRACTITIONER

## 2022-11-28 PROCEDURE — G8427 DOCREV CUR MEDS BY ELIG CLIN: HCPCS | Performed by: NURSE PRACTITIONER

## 2022-11-28 PROCEDURE — G8484 FLU IMMUNIZE NO ADMIN: HCPCS | Performed by: NURSE PRACTITIONER

## 2022-11-28 PROCEDURE — G8417 CALC BMI ABV UP PARAM F/U: HCPCS | Performed by: NURSE PRACTITIONER

## 2022-11-28 RX ORDER — NITROFURANTOIN 25; 75 MG/1; MG/1
100 CAPSULE ORAL 2 TIMES DAILY
Qty: 14 CAPSULE | Refills: 0 | Status: SHIPPED | OUTPATIENT
Start: 2022-11-28 | End: 2022-11-30 | Stop reason: ALTCHOICE

## 2022-11-28 ASSESSMENT — ENCOUNTER SYMPTOMS
BACK PAIN: 0
NAUSEA: 0
CONSTIPATION: 0
DIARRHEA: 0
ABDOMINAL PAIN: 0

## 2022-11-28 NOTE — PROGRESS NOTES
[address] [address]  Dept: [phone]  Dept Fax: [phone]    Eve Gu is a 23 y.o. female who presents to the urgent care today for her medical conditions/complaints as notedbelow. Eve Gu is c/o of Other (Feeling sick and ph balance is off onset for  a week. ) and Vaginal Discharge (No discharge but odor )      HPI:     23 yr old female presents for urinary urgency. + vaginal odor, no change in vaginal discharge  LMP oct 25th      Vaginal Discharge  The patient's primary symptoms include a genital odor. The patient's pertinent negatives include no genital itching, genital lesions, genital rash, missed menses, pelvic pain, vaginal bleeding or vaginal discharge. This is a new problem. The current episode started in the past 7 days. The problem occurs constantly. The problem has been unchanged. The patient is experiencing no pain. She is not pregnant. Associated symptoms include urgency. Pertinent negatives include no abdominal pain, anorexia, back pain, chills, constipation, diarrhea, discolored urine, dysuria, fever, flank pain, frequency, nausea or painful intercourse. Nothing aggravates the symptoms. She has tried nothing for the symptoms. The treatment provided no relief. She is sexually active. No, her partner does not have an STD. She uses nothing for contraception. There is no history of an STD or vaginosis.      Past Medical History:   Diagnosis Date    Eczema     Seizure ([facility] [address]     march 22        Current Outpatient Medications   Medication Sig Dispense Refill    nitrofurantoin, macrocrystal-monohydrate, (MACROBID) 100 MG capsule Take 1 capsule by mouth 2 times daily for 7 days 14 capsule 0    levETIRAcetam (KEPPRA) 250 MG tablet Take 1 tablet by mouth 2 times daily 60 tablet 0    levETIRAcetam (KEPPRA) 500 MG tablet Take 1 tablet by mouth in the morning and 1 tablet before bedtime. 60 tablet 1     No current facility-administered medications for this visit. No Known Allergies    Subjective:      Review of Systems   Constitutional:  Negative for chills and fever. Gastrointestinal:  Negative for abdominal pain, anorexia, constipation, diarrhea and nausea. Genitourinary:  Positive for urgency. Negative for dysuria, flank pain, frequency, missed menses, pelvic pain and vaginal discharge. Musculoskeletal:  Negative for back pain. All other systems reviewed and are negative. 14 systems reviewed and negative except as listed in HPI. Objective:     Physical Exam  Vitals and nursing note reviewed. Constitutional:       General: She is not in acute distress. Appearance: Normal appearance. She is well-developed. She is not ill-appearing, toxic-appearing or diaphoretic. Comments: nontoxic   HENT:      Head: Normocephalic and atraumatic. Right Ear: External ear normal.      Left Ear: External ear normal.   Eyes:      General:         Right eye: No discharge. Left eye: No discharge. Extraocular Movements: Extraocular movements intact. Conjunctiva/sclera: Conjunctivae normal.      Pupils: Pupils are equal, round, and reactive to light. Cardiovascular:      Rate and Rhythm: Normal rate and regular rhythm. Pulses: Normal pulses. Heart sounds: Normal heart sounds. Pulmonary:      Effort: Pulmonary effort is normal.      Breath sounds: Normal breath sounds. Abdominal:      General: Bowel sounds are normal. There is no distension. Palpations: Abdomen is soft. There is no mass. Tenderness: There is no abdominal tenderness. There is no right CVA tenderness, left CVA tenderness, guarding or rebound. Hernia: No hernia is present. Musculoskeletal:         General: No tenderness or deformity. Normal range of motion. Cervical back: Neck supple. Skin:     General: Skin is warm and dry.       Capillary Refill: Capillary refill takes less than 2 seconds. Findings: No rash ( no rash to visible skin). Neurological:      General: No focal deficit present. Mental Status: She is alert and oriented to person, place, and time. Motor: No abnormal muscle tone. Coordination: Coordination normal.   Psychiatric:         Mood and Affect: Mood normal.         Behavior: Behavior normal.     BP 99/64 (Site: Left Upper Arm, Position: Sitting, Cuff Size: Medium Adult)   Pulse 78   Temp 98.5 °F (36.9 °C) (Tympanic)   SpO2 98%     Assessment:       Diagnosis Orders   1. Acute cystitis with hematuria        2. Possible exposure to STD  POCT Urinalysis No Micro (Auto)    POCT urine pregnancy    Vaginitis DNA Probe    C.trachomatis N.gonorrhoeae DNA, Urine          Plan:      Results for POC orders placed in visit on 11/28/22   POCT Urinalysis No Micro (Auto)   Result Value Ref Range    Color, UA yellow     Clarity, UA clear     Glucose, UA POC neg     Bilirubin, UA neg     Ketones, UA neg     Spec Grav, UA 1.025     Blood, UA POC neg     pH, UA 5.5     Protein, UA POC neg     Urobilinogen, UA 0.2     Leukocytes, UA neg     Nitrite, UA positive    POCT urine pregnancy   Result Value Ref Range    Preg Test, Ur negative     Control pass      POCT preg neg  POCT urine + nits  Urine culture pending  Macrobid rx  Std testing pending - possible exposure, no symptoms  Return for make appt for urine recheck after antibiotics done. Orders Placed This Encounter   Medications    nitrofurantoin, macrocrystal-monohydrate, (MACROBID) 100 MG capsule     Sig: Take 1 capsule by mouth 2 times daily for 7 days     Dispense:  14 capsule     Refill:  0         Patient given educational materials - see patient instructions. Discussed use, benefit, and side effects of prescribed medications. All patient questions answered. Pt voicedunderstanding.     Electronically signed by ALFREDO Rashid CNP on 11/28/2022 at 3:49 PM

## 2022-11-29 DIAGNOSIS — N76.0 BV (BACTERIAL VAGINOSIS): Primary | ICD-10-CM

## 2022-11-29 DIAGNOSIS — B96.89 BV (BACTERIAL VAGINOSIS): Primary | ICD-10-CM

## 2022-11-29 LAB
C. TRACHOMATIS DNA ,URINE: NEGATIVE
CANDIDA SPECIES, DNA PROBE: NEGATIVE
CULTURE: ABNORMAL
GARDNERELLA VAGINALIS, DNA PROBE: POSITIVE
N. GONORRHOEAE DNA, URINE: NEGATIVE
SOURCE: ABNORMAL
SPECIMEN DESCRIPTION: ABNORMAL
SPECIMEN DESCRIPTION: NORMAL
TRICHOMONAS VAGINALIS DNA: NEGATIVE

## 2022-11-29 RX ORDER — METRONIDAZOLE 500 MG/1
500 TABLET ORAL 2 TIMES DAILY
Qty: 14 TABLET | Refills: 0 | Status: SHIPPED | OUTPATIENT
Start: 2022-11-29 | End: 2022-12-06

## 2022-11-30 RX ORDER — AMOXICILLIN 500 MG/1
500 CAPSULE ORAL 3 TIMES DAILY
Qty: 30 CAPSULE | Refills: 0 | Status: SHIPPED | OUTPATIENT
Start: 2022-11-30 | End: 2022-12-10

## 2023-01-30 ENCOUNTER — HOSPITAL ENCOUNTER (EMERGENCY)
Age: 23
Discharge: HOME | End: 2023-01-30
Attending: EMERGENCY MEDICINE
Payer: COMMERCIAL

## 2023-01-30 ENCOUNTER — APPOINTMENT (OUTPATIENT)
Dept: GENERAL RADIOLOGY | Age: 23
End: 2023-01-30
Payer: COMMERCIAL

## 2023-01-30 VITALS
BODY MASS INDEX: 28.93 KG/M2 | DIASTOLIC BLOOD PRESSURE: 68 MMHG | HEART RATE: 68 BPM | WEIGHT: 180 LBS | RESPIRATION RATE: 16 BRPM | SYSTOLIC BLOOD PRESSURE: 120 MMHG | OXYGEN SATURATION: 97 % | TEMPERATURE: 98 F | HEIGHT: 66 IN

## 2023-01-30 DIAGNOSIS — N30.00 ACUTE CYSTITIS WITHOUT HEMATURIA: Primary | ICD-10-CM

## 2023-01-30 LAB
BILIRUBIN URINE: NEGATIVE
CASTS UA: NORMAL /LPF (ref 0–8)
COLOR: YELLOW
EPITHELIAL CELLS UA: NORMAL /HPF (ref 0–5)
GLUCOSE URINE: NEGATIVE
HCG(URINE) PREGNANCY TEST: NEGATIVE
KETONES, URINE: ABNORMAL
LEUKOCYTE ESTERASE, URINE: ABNORMAL
NITRITE, URINE: NEGATIVE
PH UA: 5.5 (ref 5–8)
PROTEIN UA: NEGATIVE
RBC UA: NORMAL /HPF (ref 0–4)
SPECIFIC GRAVITY UA: 1.02 (ref 1–1.03)
TURBIDITY: CLEAR
URINE HGB: NEGATIVE
UROBILINOGEN, URINE: NORMAL
WBC UA: NORMAL /HPF (ref 0–5)

## 2023-01-30 PROCEDURE — 93005 ELECTROCARDIOGRAM TRACING: CPT | Performed by: HEALTH CARE PROVIDER

## 2023-01-30 PROCEDURE — 86403 PARTICLE AGGLUT ANTBDY SCRN: CPT

## 2023-01-30 PROCEDURE — 87086 URINE CULTURE/COLONY COUNT: CPT

## 2023-01-30 PROCEDURE — 81025 URINE PREGNANCY TEST: CPT

## 2023-01-30 PROCEDURE — 81001 URINALYSIS AUTO W/SCOPE: CPT

## 2023-01-30 PROCEDURE — 71046 X-RAY EXAM CHEST 2 VIEWS: CPT

## 2023-01-30 PROCEDURE — 99285 EMERGENCY DEPT VISIT HI MDM: CPT

## 2023-01-30 RX ORDER — CEPHALEXIN 500 MG/1
500 CAPSULE ORAL 2 TIMES DAILY
Qty: 14 CAPSULE | Refills: 0 | Status: SHIPPED | OUTPATIENT
Start: 2023-01-30 | End: 2023-02-06

## 2023-01-30 ASSESSMENT — ENCOUNTER SYMPTOMS
SORE THROAT: 0
ABDOMINAL PAIN: 0
VOMITING: 0
NAUSEA: 0
CHEST TIGHTNESS: 1
DIARRHEA: 0
SHORTNESS OF BREATH: 0
CONSTIPATION: 0
COUGH: 1

## 2023-01-30 NOTE — ED NOTES
Dr. Yesenia Frost at bedside to assess pt     Humza Mohamud, RN  01/30/23 5619 [address] [place] [address] [place], RN  01/30/23 0906

## 2023-01-30 NOTE — DISCHARGE INSTRUCTIONS
Thank you for visiting [facility] Emergency Department. You need to call Collette Hoof, PA-C to make an appointment as directed for follow up. We recommend following up in the next 2 to 3 days for reevaluation of your symptoms. You are found to have a urinary tract infection today. You are being discharged on a course of Keflex, an antibiotic. Please take full course as directed. Should you have any questions regarding your care or further treatment, please call [facility] at [phone]. PLEASE RETURN TO THE ED IMMEDIATELY for worsening symptoms, or if you develop any concerning symptoms such as: high fever not relieved by tylenol and/or motrin, chills, shortness of breath, chest pain, persistent nausea and/or vomiting, numbness, weakness or tingling in the arms or legs or change in color of the extremities, changes in mental status, persistent headache, blurry vision, inability to urinate, unable to follow up with your physician, or other any other  Care or concern.

## 2023-01-30 NOTE — ED PROVIDER NOTES
[facility] ED  Emergency Department Encounter  Emergency Medicine Resident     Pt Arminda Collins  MRN: [id number]  [place] 2000  Date of evaluation: 1/30/23  PCP:  Neville Jeter PA-C  Note Started: 7:54 AM EST      CHIEF COMPLAINT       Chief Complaint   Patient presents with    Abdominal Pain     Lower abdominal pain that radiates into her back since having COVID last month, denies pain currently    Urinary Frequency     Since having COVID, denies painful urination    Medication Refill     Keppra, has been out for 2 months. Last seizure 2 days ago       HISTORY OF PRESENT ILLNESS  (Location/Symptom, Timing/Onset, Context/Setting, Quality, Duration, Modifying Factors, Severity.)      Rodríguez Uribe is a 23 y.o. female with past medical history significant for eczema and seizure disorder on Keppra who presents with abdominal pain, chest pain, and cough. RLQ abdominal pain, 8/10, has been occurring more frequently with her menstrual cycle. Last cycle ~6 days ago. Chest pain and cough x several weeks. Intermittent clear sputum. Chest pain only when coughing. Feeling of incomplete emptying when she voids. Denies any headaches, dizziness, abdominal pain, nausea/vomiting/diarrhea/constipation. Patient presents for a \"general checkup\" 2/2 increased stress about her health. PAST MEDICAL / SURGICAL / SOCIAL / FAMILY HISTORY      has a past medical history of Eczema and Seizure ([facility] [address] has no past surgical history on file. Denies any significant PSHx    Social History     Socioeconomic History    Marital status: Single     Spouse name: Not on file    Number of children: Not on file    Years of education: Not on file    Highest education level: Not on file   Occupational History    Not on file   Tobacco Use    Smoking status: Never    Smokeless tobacco: Never   Vaping Use    Vaping Use: Never used   Substance and Sexual Activity    Alcohol use: No    Drug use:  No Sexual activity: Not Currently   Other Topics Concern    Not on file   Social History Narrative    Not on file     Social Determinants of Health     Financial Resource Strain: Low Risk     Difficulty of Paying Living Expenses: Not very hard   Food Insecurity: No Food Insecurity    Worried About Running Out of Food in the Last Year: Never true    Ran Out of Food in the Last Year: Never true   Transportation Needs: No Transportation Needs    Lack of Transportation (Medical): No    Lack of Transportation (Non-Medical): No   Physical Activity: Not on file   Stress: Not on file   Social Connections: Not on file   Intimate Partner Violence: Not on file   Housing Stability: Not on file       Family History   Problem Relation Age of Onset    Diabetes Mother     No Known Problems Father        Allergies:  Patient has no known allergies. Home Medications:  Prior to Admission medications    Medication Sig Start Date End Date Taking? Authorizing Provider   cephALEXin (KEFLEX) 500 MG capsule Take 1 capsule by mouth 2 times daily for 7 days 1/30/23 2/6/23 Yes Fabian Dominguez,    levETIRAcetam (KEPPRA) 500 MG tablet Take 1 tablet by mouth in the morning and 1 tablet before bedtime. 8/9/22 9/8/22  Adarsh Crowe PA-C   levETIRAcetam (KEPPRA) 250 MG tablet Take 1 tablet by mouth 2 times daily 6/30/22   Adasrh Crowe PA-C     REVIEW OF SYSTEMS       Review of Systems   Constitutional:  Negative for chills and fever. HENT:  Negative for ear pain, hearing loss and sore throat. Eyes:  Negative for visual disturbance. Respiratory:  Positive for cough and chest tightness. Negative for shortness of breath. Cardiovascular:  Positive for chest pain. Gastrointestinal:  Negative for abdominal pain, constipation, diarrhea, nausea and vomiting. Genitourinary:  Negative for difficulty urinating and dysuria. Musculoskeletal:  Negative for arthralgias and myalgias. Neurological:  Negative for numbness. Psychiatric/Behavioral:  Negative for agitation and confusion. PHYSICAL EXAM      INITIAL VITALS:   /68   Pulse 68   Temp 98 °F (36.7 °C) (Oral)   Resp 16   Ht 5' 6\" (1.676 m)   Wt 180 lb (81.6 kg)   LMP 01/18/2023   SpO2 97%   BMI 29.05 kg/m²     Physical Exam  Vitals and nursing note reviewed. Constitutional:       General: She is not in acute distress. Appearance: Normal appearance. She is well-developed. She is not ill-appearing or diaphoretic. HENT:      Head: Normocephalic and atraumatic. Right Ear: External ear normal.      Left Ear: External ear normal.      Nose: Nose normal.      Mouth/Throat:      Mouth: Mucous membranes are moist.   Eyes:      Extraocular Movements: Extraocular movements intact. Conjunctiva/sclera: Conjunctivae normal.   Neck:      Trachea: No tracheal deviation. Cardiovascular:      Rate and Rhythm: Normal rate and regular rhythm. Pulmonary:      Effort: Pulmonary effort is normal. No respiratory distress. Abdominal:      General: Abdomen is flat. There is no distension. Tenderness: There is abdominal tenderness in the suprapubic area. Musculoskeletal:         General: No swelling, deformity or signs of injury. Normal range of motion. Cervical back: Normal range of motion and neck supple. Skin:     General: Skin is warm and dry. Coloration: Skin is not jaundiced. Findings: No bruising or lesion. Neurological:      General: No focal deficit present. Mental Status: She is alert and oriented to person, place, and time. Mental status is at baseline. Motor: No abnormal muscle tone. DDX/DIAGNOSTIC RESULTS / EMERGENCY DEPARTMENT COURSE / MDM     Medical Decision Making  Patient is 68-year-old female presents today with concern for chest pain, abdominal pain, cough is been going on for the last month. Examination examination patient was in no acute distress, vital signs are stable this time.   She is normotensive, afebrile, heart rate 68 and saturating 97% on room air. Respirations 16 and unlabored. This exam findings for some mild suprapubic tenderness. We will plan for chest x-ray, urinalysis, EKG. Plan for likely discharge with follow-up with a primary care physician. Amount and/or Complexity of Data Reviewed  Labs: ordered. Decision-making details documented in ED Course. Radiology: ordered. ECG/medicine tests: ordered. Risk  Prescription drug management. EKG  EKG Interpretation    Interpreted by me    Rhythm: normal sinus   Rate: normal  Axis: normal  Ectopy: none  Conduction: normal  ST Segments: no acute change  T Waves: no acute change  Q Waves: none    Clinical Impression: no acute changes    All EKG's are interpreted by the Emergency Department Physician who either signs or Co-signs this chart in the absence of a cardiologist.    EMERGENCY DEPARTMENT COURSE:  ED Course as of 01/30/23 1740   Mon Jan 30, 2023   0933 Leukocyte Esterase, Urine(!): MODERATE [JS]   0933 WBC, UA: 20 TO 50 [JS]   0933 RBC, UA: 5 TO 10 [JS]   0933 CXR w/o any acute cardiopulmonary processes. [JS]   1010 Patient updated on results and plan of care. Plan to discharge on a course of Keflex. Follow up plans and ER return precautions discussed. Patient verbalized understanding and had no further questions at time of discharge. [JS]      ED Course User Index  [JS] Fabian Dominguez DO       PROCEDURES:    CONSULTS:  None    CRITICAL CARE:    FINAL IMPRESSION      1. Acute cystitis without hematuria          DISPOSITION / PLAN     DISPOSITION Decision To Discharge 01/30/2023 10:09:01 AM      PATIENT REFERRED TO:  Kori Meza, [address] [place] [address]  Dr. Eduar Medrano [address]  [address] [facility] [address]  [phone]    Call in 1 day  To discuss this ER visit and any further follow up needed    [facility] ED  [address] [place] [address]  [phone]  Go to   As needed, If symptoms worsen    DISCHARGE MEDICATIONS:  Discharge Medication List as of 1/30/2023 10:10 AM        START taking these medications    Details   cephALEXin (KEFLEX) 500 MG capsule Take 1 capsule by mouth 2 times daily for 7 days, Disp-14 capsule, R-0Print             Reinaldo Pradhan DO  Emergency Medicine Resident    (Please note that portions of thisnote were completed with a voice recognition program.  Efforts were made to edit the dictations but occasionally words are mis-transcribed.)      Buddy Granger DO  Resident  01/30/23 5810

## 2023-01-30 NOTE — ED NOTES
Labeled urine specimen sent to lab via tube system.     [x] Urine Sample   [x]  Clean catch   [] Straight cath   [] Urine voided   []  Indwelling catheter   []  Suprapubic catheter     Mariia Worley RN  01/30/23 8730

## 2023-01-30 NOTE — ED PROVIDER NOTES
[facility] ED     Emergency Department     Faculty Attestation        I performed a history and physical examination of the patient and discussed management with the resident. I reviewed the residents note and agree with the documented findings and plan of care. Any areas of disagreement are noted on the chart. I was personally present for the key portions of any procedures. I have documented in the chart those procedures where I was not present during the key portions. I have reviewed the emergency nurses triage note. I agree with the chief complaint, past medical history, past surgical history, allergies, medications, social and family history as documented unless otherwise noted below. For Physician Assistant/ Nurse Practitioner cases/documentation I have personally evaluated this patient and have completed at least one if not all key elements of the E/M (history, physical exam, and MDM). Additional findings are as noted. Vital Signs: BP: 120/68  Heart Rate: 68  Resp: 16  Temp: 98 °F (36.7 °C) SpO2: 97 %  PCP:  Kulwant Fay PA-C    Pertinent Comments:           EKG Interpretation    Interpreted by emergency department physician    Rhythm: normal sinus   Rate: normal at 76 bpm  Axis: normal  Conduction: normal  ST Segments: no acute change  T Waves: no acute change  Q Waves: no acute change    Clinical Impression:  nonspecific EKG and appears unchanged from previous EKG on 5/5/2022 when compared      Critical Care  None      (Please note that portions of this note were completed with a voice recognition program. Efforts were made to edit the dictations but occasionally words are mis-transcribed.  Whenever words are used in this note in any gender, they shall be construed as though they were used in the gender appropriate to the circumstances; and whenever words are used in this note in the singular or plural form, they shall be construed as though they were used in the form appropriate to the circumstances.)    MD Jose J Blandon  Attending Emergency Medicine Physician           Mariya Mcbride MD  01/30/23 1544

## 2023-01-30 NOTE — ED NOTES
Pt arrived to ED alert and oriented x4 via triage. Pt c/o abdominal pain and urinary frequency. Pt reports that she was diagnosed with COVID around [place] and states that she has had abdominal pain since. Pt reports the pain was in her RLQ that radiates into the middle of her back, denies pain currently and denies n/v/d/c. Pt reports that she also has had urinary frequency since getting COVID, denies pain with urination or vaginal complaints. Pt also reports being out of her Keppra for 2 months, states last seizure was 2 days ago. Pt denies having been around anyone suspected to have COVID-19 or anyone that has been sick, denies recent travel outside the [facility] or [address]. Pt placed on continuous pulse ox and BP cuff. RR even and unlabored. NAD noted. Whiteboard updated. Will continue with plan of care.      Humza Mohamud RN  01/30/23 [address] [facility] GISELLE Orozco  01/30/23 0800

## 2023-01-31 LAB
CULTURE: ABNORMAL
EKG ATRIAL RATE: 76 BPM
EKG P AXIS: 51 DEGREES
EKG P-R INTERVAL: 142 MS
EKG Q-T INTERVAL: 374 MS
EKG QRS DURATION: 82 MS
EKG QTC CALCULATION (BAZETT): 420 MS
EKG R AXIS: 7 DEGREES
EKG T AXIS: 10 DEGREES
EKG VENTRICULAR RATE: 76 BPM
SPECIMEN DESCRIPTION: ABNORMAL

## 2023-01-31 PROCEDURE — 93010 ELECTROCARDIOGRAM REPORT: CPT | Performed by: INTERNAL MEDICINE

## 2023-05-28 ENCOUNTER — APPOINTMENT (OUTPATIENT)
Dept: GENERAL RADIOLOGY | Age: 23
End: 2023-05-28
Payer: COMMERCIAL

## 2023-05-28 ENCOUNTER — HOSPITAL ENCOUNTER (EMERGENCY)
Age: 23
Discharge: HOME | End: 2023-05-28
Attending: EMERGENCY MEDICINE
Payer: COMMERCIAL

## 2023-05-28 VITALS
TEMPERATURE: 98.2 F | DIASTOLIC BLOOD PRESSURE: 87 MMHG | RESPIRATION RATE: 15 BRPM | SYSTOLIC BLOOD PRESSURE: 125 MMHG | OXYGEN SATURATION: 95 % | HEART RATE: 56 BPM

## 2023-05-28 DIAGNOSIS — J06.9 UPPER RESPIRATORY TRACT INFECTION, UNSPECIFIED TYPE: Primary | ICD-10-CM

## 2023-05-28 DIAGNOSIS — J45.909 MILD REACTIVE AIRWAYS DISEASE, UNSPECIFIED WHETHER PERSISTENT: ICD-10-CM

## 2023-05-28 LAB
BILIRUB UR QL STRIP: NEGATIVE
CLARITY UR: CLEAR
COLOR UR: YELLOW
EPI CELLS #/AREA URNS HPF: NORMAL /HPF (ref 0–5)
GLUCOSE UR STRIP-MCNC: NEGATIVE MG/DL
HCG(URINE) PREGNANCY TEST: NEGATIVE
HGB UR QL STRIP.AUTO: NEGATIVE
KETONES UR STRIP-MCNC: NEGATIVE MG/DL
LEUKOCYTE ESTERASE UR QL STRIP: NEGATIVE
NITRITE UR QL STRIP: NEGATIVE
PH UR STRIP: 7.5 [PH] (ref 5–8)
PROT UR STRIP-MCNC: NEGATIVE MG/DL
RBC #/AREA URNS HPF: NORMAL /HPF (ref 0–4)
SARS-COV-2 RDRP RESP QL NAA+PROBE: NOT DETECTED
SP GR UR STRIP: 1.01 (ref 1–1.03)
SPECIMEN DESCRIPTION: NORMAL
UROBILINOGEN UR STRIP-ACNC: NORMAL
WBC #/AREA URNS HPF: NORMAL /HPF (ref 0–5)

## 2023-05-28 PROCEDURE — 81001 URINALYSIS AUTO W/SCOPE: CPT

## 2023-05-28 PROCEDURE — 87086 URINE CULTURE/COLONY COUNT: CPT

## 2023-05-28 PROCEDURE — 6370000000 HC RX 637 (ALT 250 FOR IP)

## 2023-05-28 PROCEDURE — 93005 ELECTROCARDIOGRAM TRACING: CPT

## 2023-05-28 PROCEDURE — 81025 URINE PREGNANCY TEST: CPT

## 2023-05-28 PROCEDURE — 99284 EMERGENCY DEPT VISIT MOD MDM: CPT

## 2023-05-28 PROCEDURE — 87635 SARS-COV-2 COVID-19 AMP PRB: CPT

## 2023-05-28 PROCEDURE — 94664 DEMO&/EVAL PT USE INHALER: CPT

## 2023-05-28 PROCEDURE — 71046 X-RAY EXAM CHEST 2 VIEWS: CPT

## 2023-05-28 PROCEDURE — 94640 AIRWAY INHALATION TREATMENT: CPT

## 2023-05-28 RX ORDER — IPRATROPIUM BROMIDE AND ALBUTEROL SULFATE 2.5; .5 MG/3ML; MG/3ML
1 SOLUTION RESPIRATORY (INHALATION)
Status: DISCONTINUED | OUTPATIENT
Start: 2023-05-28 | End: 2023-05-28 | Stop reason: HOSPADM

## 2023-05-28 RX ORDER — ACETAMINOPHEN 500 MG
1000 TABLET ORAL ONCE
Status: DISCONTINUED | OUTPATIENT
Start: 2023-05-28 | End: 2023-05-28

## 2023-05-28 RX ORDER — IBUPROFEN 400 MG/1
400 TABLET ORAL ONCE
Status: COMPLETED | OUTPATIENT
Start: 2023-05-28 | End: 2023-05-28

## 2023-05-28 RX ORDER — BENZONATATE 100 MG/1
100 CAPSULE ORAL 3 TIMES DAILY PRN
Status: DISCONTINUED | OUTPATIENT
Start: 2023-05-28 | End: 2023-05-28 | Stop reason: HOSPADM

## 2023-05-28 RX ORDER — IBUPROFEN 400 MG/1
400 TABLET ORAL ONCE
Status: DISCONTINUED | OUTPATIENT
Start: 2023-05-28 | End: 2023-05-28

## 2023-05-28 RX ORDER — BENZONATATE 100 MG/1
100 CAPSULE ORAL 3 TIMES DAILY PRN
Qty: 15 CAPSULE | Refills: 0 | Status: SHIPPED | OUTPATIENT
Start: 2023-05-28 | End: 2023-06-02

## 2023-05-28 RX ORDER — ALBUTEROL SULFATE 90 UG/1
2 AEROSOL, METERED RESPIRATORY (INHALATION) 4 TIMES DAILY PRN
Qty: 54 G | Refills: 0 | Status: SHIPPED | OUTPATIENT
Start: 2023-05-28 | End: 2023-07-18

## 2023-05-28 RX ORDER — IBUPROFEN 600 MG/1
600 TABLET ORAL 4 TIMES DAILY PRN
Qty: 20 TABLET | Refills: 0 | Status: SHIPPED | OUTPATIENT
Start: 2023-05-28 | End: 2023-06-02

## 2023-05-28 RX ADMIN — BENZONATATE 100 MG: 100 CAPSULE ORAL at 16:24

## 2023-05-28 RX ADMIN — IPRATROPIUM BROMIDE AND ALBUTEROL SULFATE 1 DOSE: .5; 2.5 SOLUTION RESPIRATORY (INHALATION) at 17:10

## 2023-05-28 RX ADMIN — ACETAMINOPHEN 1000 MG: 500 TABLET ORAL at 16:24

## 2023-05-28 RX ADMIN — IBUPROFEN 400 MG: 400 TABLET, FILM COATED ORAL at 16:33

## 2023-05-28 ASSESSMENT — PAIN DESCRIPTION - LOCATION: LOCATION: THROAT

## 2023-05-28 ASSESSMENT — PAIN SCALES - GENERAL: PAINLEVEL_OUTOF10: 8

## 2023-05-29 LAB
MICROORGANISM SPEC CULT: NORMAL
SPECIMEN DESCRIPTION: NORMAL

## 2023-05-29 ASSESSMENT — ENCOUNTER SYMPTOMS
SHORTNESS OF BREATH: 1
CHEST TIGHTNESS: 1

## 2023-06-01 LAB
EKG ATRIAL RATE: 63 BPM
EKG P AXIS: 46 DEGREES
EKG P-R INTERVAL: 138 MS
EKG Q-T INTERVAL: 362 MS
EKG QRS DURATION: 84 MS
EKG QTC CALCULATION (BAZETT): 370 MS
EKG R AXIS: 31 DEGREES
EKG T AXIS: 29 DEGREES
EKG VENTRICULAR RATE: 63 BPM

## 2023-06-01 PROCEDURE — 93010 ELECTROCARDIOGRAM REPORT: CPT | Performed by: INTERNAL MEDICINE

## 2023-08-02 RX ORDER — ALBUTEROL SULFATE 90 UG/1
AEROSOL, METERED RESPIRATORY (INHALATION)
Qty: 54 G | Refills: 0 | OUTPATIENT
Start: 2023-08-02

## 2023-09-19 ENCOUNTER — TELEPHONE (OUTPATIENT)
Dept: NEUROLOGY | Age: 23
End: 2023-09-19

## 2023-09-19 NOTE — TELEPHONE ENCOUNTER
09 19 2023 I called the patient times 2 (08 16 2023 and 09 13 2023 at [phone])  to schedule new patient appointment with one of our providers, FACUNDO both times, no response. I mailed the patient a letter asking them to call the office back to schedule this appointment.   KS

## 2024-02-22 ENCOUNTER — HOSPITAL ENCOUNTER (EMERGENCY)
Age: 24
Discharge: HOME | End: 2024-02-22
Attending: EMERGENCY MEDICINE
Payer: COMMERCIAL

## 2024-02-22 ENCOUNTER — APPOINTMENT (OUTPATIENT)
Dept: ULTRASOUND IMAGING | Age: 24
End: 2024-02-22
Payer: COMMERCIAL

## 2024-02-22 VITALS
DIASTOLIC BLOOD PRESSURE: 76 MMHG | WEIGHT: 159.39 LBS | HEART RATE: 84 BPM | OXYGEN SATURATION: 97 % | BODY MASS INDEX: 31.29 KG/M2 | SYSTOLIC BLOOD PRESSURE: 113 MMHG | HEIGHT: 60 IN | RESPIRATION RATE: 14 BRPM | TEMPERATURE: 98.4 F

## 2024-02-22 DIAGNOSIS — Z3A.01 LESS THAN 8 WEEKS GESTATION OF PREGNANCY: Primary | ICD-10-CM

## 2024-02-22 DIAGNOSIS — O36.80X0 PREGNANCY, LOCATION UNKNOWN: ICD-10-CM

## 2024-02-22 LAB
ANION GAP SERPL CALCULATED.3IONS-SCNC: 13 MMOL/L (ref 9–17)
B-HCG SERPL EIA 3RD IS-ACNC: 2741 MIU/ML
BACTERIA URNS QL MICRO: ABNORMAL
BASOPHILS # BLD: 0.07 K/UL (ref 0–0.2)
BASOPHILS NFR BLD: 1 % (ref 0–2)
BILIRUB UR QL STRIP: NEGATIVE
BUN SERPL-MCNC: 6 MG/DL (ref 6–20)
CALCIUM SERPL-MCNC: 9.1 MG/DL (ref 8.6–10.4)
CANDIDA SPECIES: NEGATIVE
CASTS #/AREA URNS LPF: ABNORMAL /LPF (ref 0–8)
CHLORIDE SERPL-SCNC: 104 MMOL/L (ref 98–107)
CLARITY UR: CLEAR
CO2 SERPL-SCNC: 20 MMOL/L (ref 20–31)
COLOR UR: YELLOW
CREAT SERPL-MCNC: 0.7 MG/DL (ref 0.5–0.9)
EOSINOPHIL # BLD: 0.57 K/UL (ref 0–0.44)
EOSINOPHILS RELATIVE PERCENT: 6 % (ref 1–4)
EPI CELLS #/AREA URNS HPF: ABNORMAL /HPF (ref 0–5)
ERYTHROCYTE [DISTWIDTH] IN BLOOD BY AUTOMATED COUNT: 15.5 % (ref 11.8–14.4)
GARDNERELLA VAGINALIS: NEGATIVE
GFR SERPL CREATININE-BSD FRML MDRD: >60 ML/MIN/1.73M2
GLUCOSE SERPL-MCNC: 86 MG/DL (ref 70–99)
GLUCOSE UR STRIP-MCNC: NEGATIVE MG/DL
HCG UR QL: POSITIVE
HCT VFR BLD AUTO: 38 % (ref 36.3–47.1)
HGB BLD-MCNC: 12.1 G/DL (ref 11.9–15.1)
HGB UR QL STRIP.AUTO: NEGATIVE
IMM GRANULOCYTES # BLD AUTO: 0.03 K/UL (ref 0–0.3)
IMM GRANULOCYTES NFR BLD: 0 %
KETONES UR STRIP-MCNC: NEGATIVE MG/DL
LEUKOCYTE ESTERASE UR QL STRIP: ABNORMAL
LYMPHOCYTES NFR BLD: 3.24 K/UL (ref 1.1–3.7)
LYMPHOCYTES RELATIVE PERCENT: 33 % (ref 25–45)
MCH RBC QN AUTO: 24.4 PG (ref 25.2–33.5)
MCHC RBC AUTO-ENTMCNC: 31.8 G/DL (ref 28.4–34.8)
MCV RBC AUTO: 76.6 FL (ref 82.6–102.9)
MONOCYTES NFR BLD: 0.89 K/UL (ref 0.1–1.4)
MONOCYTES NFR BLD: 9 % (ref 2–8)
NEUTROPHILS NFR BLD: 51 % (ref 34–64)
NEUTS SEG NFR BLD: 5.15 K/UL (ref 1.5–8.1)
NITRITE UR QL STRIP: NEGATIVE
NRBC BLD-RTO: 0 PER 100 WBC
PH UR STRIP: 6.5 [PH] (ref 5–8)
PLATELET # BLD AUTO: 288 K/UL (ref 138–453)
PMV BLD AUTO: 10.7 FL (ref 8.1–13.5)
POTASSIUM SERPL-SCNC: 3.6 MMOL/L (ref 3.7–5.3)
PROT UR STRIP-MCNC: NEGATIVE MG/DL
RBC # BLD AUTO: 4.96 M/UL (ref 3.95–5.11)
RBC # BLD: ABNORMAL 10*6/UL
RBC #/AREA URNS HPF: ABNORMAL /HPF (ref 0–4)
SODIUM SERPL-SCNC: 137 MMOL/L (ref 135–144)
SOURCE: NORMAL
SP GR UR STRIP: 1 (ref 1–1.03)
TRICHOMONAS: NEGATIVE
UROBILINOGEN UR STRIP-ACNC: NORMAL EU/DL (ref 0–1)
WBC #/AREA URNS HPF: ABNORMAL /HPF (ref 0–5)
WBC OTHER # BLD: 10 K/UL (ref 4.5–13.5)

## 2024-02-22 PROCEDURE — 87510 GARDNER VAG DNA DIR PROBE: CPT

## 2024-02-22 PROCEDURE — 87480 CANDIDA DNA DIR PROBE: CPT

## 2024-02-22 PROCEDURE — 84702 CHORIONIC GONADOTROPIN TEST: CPT

## 2024-02-22 PROCEDURE — 85025 COMPLETE CBC W/AUTO DIFF WBC: CPT

## 2024-02-22 PROCEDURE — 76817 TRANSVAGINAL US OBSTETRIC: CPT

## 2024-02-22 PROCEDURE — 87491 CHLMYD TRACH DNA AMP PROBE: CPT

## 2024-02-22 PROCEDURE — 87591 N.GONORRHOEAE DNA AMP PROB: CPT

## 2024-02-22 PROCEDURE — 99284 EMERGENCY DEPT VISIT MOD MDM: CPT

## 2024-02-22 PROCEDURE — 81025 URINE PREGNANCY TEST: CPT

## 2024-02-22 PROCEDURE — 87660 TRICHOMONAS VAGIN DIR PROBE: CPT

## 2024-02-22 PROCEDURE — 6370000000 HC RX 637 (ALT 250 FOR IP): Performed by: EMERGENCY MEDICINE

## 2024-02-22 PROCEDURE — 81001 URINALYSIS AUTO W/SCOPE: CPT

## 2024-02-22 PROCEDURE — 87086 URINE CULTURE/COLONY COUNT: CPT

## 2024-02-22 PROCEDURE — 80048 BASIC METABOLIC PNL TOTAL CA: CPT

## 2024-02-22 RX ORDER — POTASSIUM CHLORIDE 20 MEQ/1
40 TABLET, EXTENDED RELEASE ORAL ONCE
Status: COMPLETED | OUTPATIENT
Start: 2024-02-22 | End: 2024-02-22

## 2024-02-22 RX ADMIN — POTASSIUM CHLORIDE 40 MEQ: 1500 TABLET, EXTENDED RELEASE ORAL at 20:24

## 2024-02-22 ASSESSMENT — PAIN - FUNCTIONAL ASSESSMENT: PAIN_FUNCTIONAL_ASSESSMENT: NONE - DENIES PAIN

## 2024-02-22 ASSESSMENT — ENCOUNTER SYMPTOMS
NAUSEA: 0
SHORTNESS OF BREATH: 0
COUGH: 0
VOMITING: 0
ABDOMINAL PAIN: 1

## 2024-02-22 NOTE — ED NOTES
Pt presented to ED via triage for the complaint of lower abd cramping. Pt states she took a pregnancy test which showed positive. Pt denies vaginal bleeding. Pt alert and oriented x 4. RR even and non labored.

## 2024-02-22 NOTE — ED PROVIDER NOTES
[facility] ED     Emergency Department     Faculty Attestation    I performed a history and physical examination of the patient and discussed management with the resident. I reviewed the resident’s note and agree with the documented findings and plan of care. Any areas of disagreement are noted on the chart. I was personally present for the key portions of any procedures. I have documented in the chart those procedures where I was not present during the key portions. I have reviewed the emergency nurses triage note. I agree with the chief complaint, past medical history, past surgical history, allergies, medications, social and family history as documented unless otherwise noted below. For Physician Assistant/ Nurse Practitioner cases/documentation I have personally evaluated this patient and have completed at least one if not all key elements of the E/M (history, physical exam, and MDM). Additional findings are as noted.    Note Started: 6:41 PM EST    Patient here with 5 days of abdominal cramping.  LMP approximately a month ago she is not exactly sure date, positive home pregnancy test today.  No bleeding spotting or discharge no nausea vomiting.  This is her first pregnancy.  On exam nontoxic well-appearing watching TV.  Abdomen soft no focal tenderness rebound or guarding.  Will confirm pregnancy proceed with first trimester workup OB consult if needed probable discharge    Critical Care     none    Florentin Plascencia MD, FACEP, FAAEM  Attending Emergency  Physician           Florentin Plascencia MD  02/22/24 9682

## 2024-02-22 NOTE — ED PROVIDER NOTES
[facility] ED  Emergency Department Encounter  Emergency Medicine Resident     Pt Name:Baldo Najera  MRN: [id number]  Birthdate 2000  Date of evaluation: 2/22/24  PCP:  Jany Steel APRN - CNP  Note Started: 6:19 PM EST      CHIEF COMPLAINT       Chief Complaint   Patient presents with    Abdominal Cramping     X 5 days, + pregnancy test today       HISTORY OF PRESENT ILLNESS  (Location/Symptom, Timing/Onset, Context/Setting, Quality, Duration, Modifying Factors, Severity.)      Baldo Najera is a 23 y.o. female who presents with 5 days of abdominal cramping.  This prompted her to take a home pregnancy test today which was positive.  Patient is unclear when her last menstrual period was.  States she it was sometime in January.  Patient does not had any abnormal vaginal bleeding or vaginal discharge.  Denies any dysuria or urinary frequency.  Patient is a G1, P0.    PAST MEDICAL / SURGICAL / SOCIAL / FAMILY HISTORY      has a past medical history of Eczema and Seizure (HCC).       has no past surgical history on file.      Social History     Socioeconomic History    Marital status: Single     Spouse name: Not on file    Number of children: Not on file    Years of education: Not on file    Highest education level: Not on file   Occupational History    Not on file   Tobacco Use    Smoking status: Never    Smokeless tobacco: Never   Vaping Use    Vaping Use: Never used   Substance and Sexual Activity    Alcohol use: No    Drug use: No    Sexual activity: Not Currently   Other Topics Concern    Not on file   Social History Narrative    Not on file     Social Determinants of Health     Financial Resource Strain: Low Risk  (5/11/2022)    Overall Financial Resource Strain (CARDIA)     Difficulty of Paying Living Expenses: Not very hard   Food Insecurity: No Food Insecurity (5/11/2022)    Hunger Vital Sign     Worried About Running Out of Food in the Last Year: Never true     Ran Out of  clinical and imaging findings.    Amount and/or Complexity of Data Reviewed  Labs: ordered. Decision-making details documented in ED Course.  Radiology: ordered.    Risk  Prescription drug management.      EMERGENCY DEPARTMENT COURSE:    ED Course as of 24   Thu  HCG(Urine) Pregnancy Test(!): POSITIVE [BL]    hCG Quant(!): 2,741.0 [BL]    Bacteria, UA: None [BL]    Leukocyte Esterase, Urine(!): TRACE [BL]    WBC, UA: 2 TO 5 [BL]    Epithelial Cells, UA: 2 TO 5 [BL]    Potassium(!): 3.6 [BL]    Trichomonas: NEGATIVE [BL]    GARDNERELLA VAGINALIS: NEGATIVE [BL]    SAMANTHA SPECIES: NEGATIVE [BL]    The visualized cystic structure in the endometrial cavity measures 5.9 x 6.1  x 2.9 mm may represent an early IUP.  Follow-up is recommended.     IMPRESSION:  Given the patient's beta HCG is low, differential considerations include an  early normal pregnancy versus abnormal pregnancy (including  or  ectopic pregnancy).  Continued follow-up beta HCG and ultrasound recommended.   [BL]    Spoke with OB who will come see the patient.  [BL]    Remainder of care signed out to oncoming resident. [BL]      ED Course User Index  [BL] Ofelia Zuñiga DO       PROCEDURES:      CONSULTS:  IP CONSULT TO OB GYN    CRITICAL CARE:  There was significant risk of life threatening deterioration of patient's condition requiring my direct management. Critical care time 0 minutes, excluding any documented procedures.    FINAL IMPRESSION      1. Less than 8 weeks gestation of pregnancy          DISPOSITION / PLAN     DISPOSITION        PATIENT REFERRED TO:  No follow-up provider specified.    DISCHARGE MEDICATIONS:  New Prescriptions    No medications on file       Ofelia Zuñiga DO  Emergency Medicine Resident    (Please note that portions of this note were completed with a voice recognition program.  Efforts were made to edit the dictations but occasionally

## 2024-02-22 NOTE — ED NOTES
The following labs were labeled with appropriate pt sticker and tubed to lab:     [x] Blue     [x] Lavender   [] on ice  [x] Green/yellow  [] Green/black [] on ice  [] Vasquez  [] on ice  [x] Yellow  [] Red  [x] Pink  [] Type/ Screen  [] ABG  [] VBG    [] COVID-19 swab    [] Rapid  [] PCR  [] Flu swab  [] Peds Viral Panel     [x] Urine Sample  [] Fecal Sample  [x] Pelvic Cultures  [] Blood Cultures  [] X 2  [] STREP Cultures  [] Wound Cultures

## 2024-02-23 ENCOUNTER — TELEPHONE (OUTPATIENT)
Dept: OBGYN | Age: 24
End: 2024-02-23

## 2024-02-23 LAB
C TRACH DNA SPEC QL PROBE+SIG AMP: NEGATIVE
MICROORGANISM SPEC CULT: NORMAL
N GONORRHOEA DNA SPEC QL PROBE+SIG AMP: NEGATIVE
SPECIMEN DESCRIPTION: NORMAL
SPECIMEN DESCRIPTION: NORMAL

## 2024-02-23 NOTE — CONSULTS
OB/GYN Consult  [facility]    Patient Name: Baldo Najera     Patient : 2000  Room/Bed: [facility]  Admission Date/Time: 2024  6:18 PM  Primary Care Physician: Jany Steel, APRN - CNP    Consulting Provider: Dr. Zuñiga  Reason for Consult:  pregnancy of unknown location, no definitive IUP on TVUS, abdominal cramping    CC:   Chief Complaint   Patient presents with    Abdominal Cramping     X 5 days, + pregnancy test today                HPI: Baldo Najera is a 23 y.o. female  presents with 5 days of abdominal pain.  She reports that her last menstrual period was at the end of January approximately 2024.  This abdominal pain prompted her to take a home pregnancy test which was positive, so she presented today to the ER.  Her abdominal pain is lower in nature and more cramping.  She denies sharp abdominal pain.  She denies nausea, vomiting, diarrhea, constipation.  She reports she has never been pregnant.  She has never had a miscarriage.  She has never had an .  Her and her partner have been actively trying to conceive for almost 1 year.  She reports history of regular cycles until about a year ago when she was diagnosed with a seizure disorder and has been taking Keppra daily.  Patient denies vaginal bleeding, lightheadedness, dizziness, severe abdominal pain.  She denies a history of any STDs.  Does not have regular OB/GYN.   Patient's last menstrual period was 2024 (approximate).     REVIEW OF SYSTEMS:   Constitutional: negative fever, negative chills, negative weight changes   HEENT: negative visual disturbances, negative headaches, negative dizziness, negative hearing loss  Breast: Negative breast abnormalities, negative breast lumps, negative nipple discharge  Respiratory: negative dyspnea, negative cough, negative SOB  Cardiovascular: negative chest pain,  negative palpitations, negative arrhythmia, negative syncope   Gastrointestinal:        ASSESSMENT & PLAN:    Baldo Najera is a 23 y.o. female    -Here for abdominal cramping and a positive pregnancy test.  LMP approximately 2024   -Denies vaginal bleeding   -VSS   -hCG quant 2741 today   -H/H: 12.1/38.0   -Vaginitis panel negative, GC pending   -Pelvic exam showing nulliparous cervix without dilation, no vaginal bleeding, no vaginal discharge  -TVUS: showing A single cystic structure is seen in the endometrial cavity without fetal pole or yolk sac. Echogenic structure seen in the right ovary measuring 2.9 cm may represent involuting corpus luteum cyst  or hemorrhagic cyst. Small amount of free fluid in the pelvic cul-de-sac   -Patient is nontender to palpation in all abdominal quadrants, no rebound, guarding, rigidity   -States that her abdominal cramping is mostly resolved   -Differential diagnosis includes early pregnancy, ectopic pregnancy, pregnancy failure   -Discussed with patient that her transvaginal ultrasound findings are most consistent with pregnancy of unknown location at this time.  Discussed with patient that, based on her LMP and TVUS, cannot permit this time if she has a viable versus nonviable pregnancy. Lower clinical suspicion at this time for ectopic pregnancy given patient's benign abdominal exam, and possible intrauterine findings suggestive of early pregnancy.  Patient notably tearful with this information as she has been trying to get pregnant for almost 1 year   -Discussed repeat hCG quant in 48 hours with patient and repeat dating ultrasound in 10 to 14 days [facility] clinic   -Also discussed that regardless of the results, would recommend to follow-up with [facility] OB/[facility] clinic to further evaluate her possible infertility   -Patient given strict return precautions including returning to the ED if she begins to experience worsening abdominal pain, vaginal bleeding, passage of clots, lightheadedness/dizziness, chest pain, or shortness of breath   -Stable for

## 2024-02-23 NOTE — DISCHARGE INSTRUCTIONS
Seen in the emergency department for abdominal pain.  You are found to be pregnant, you will need to get a repeat hCG, blood pregnancy test and 48 hours.  I have included the contact information for the OB/GYN clinic.    Please follow-up with your primary care provider within 1 to 2 days of discharge as well.    Please return to the emergency department with any new or worsening symptoms including increased abdominal pain, vaginal bleeding, v excessive nausea vomiting, dizziness, syncope or any other concerning symptoms.

## 2024-02-23 NOTE — TELEPHONE ENCOUNTER
----- Message from Corin Servin DO sent at 2/22/2024 10:07 PM EST -----  Regarding: US appointment  Please schedule this patient in 10-14 days for a dating Ultrasound.     Thanks,  Corin Servin

## 2024-02-23 NOTE — ED PROVIDER NOTES
[facility] ED  Emergency Department  Emergency Medicine Resident Turn-Over     Note Started: 9:17 PM EST    Care of Baldo Najera was assumed from Dr. Zuñiga and is being seen for Abdominal Cramping (X 5 days, + pregnancy test today)  .  The patient's initial evaluation and plan have been discussed with the prior provider who initially evaluated the patient.     EMERGENCY DEPARTMENT COURSE / MEDICAL DECISION MAKING:       MEDICATIONS GIVEN:  Orders Placed This Encounter   Medications    potassium chloride (KLOR-CON M) extended release tablet 40 mEq       LABS / RADIOLOGY:     Labs Reviewed   CBC WITH AUTO DIFFERENTIAL - Abnormal; Notable for the following components:       Result Value    MCV 76.6 (*)     MCH 24.4 (*)     RDW 15.5 (*)     Monocytes % 9 (*)     Eosinophils % 6 (*)     Eosinophils Absolute 0.57 (*)     All other components within normal limits   BASIC METABOLIC PANEL - Abnormal; Notable for the following components:    Potassium 3.6 (*)     All other components within normal limits   HCG, QUANTITATIVE, PREGNANCY - Abnormal; Notable for the following components:    hCG Quant 2,741.0 (*)     All other components within normal limits   URINALYSIS WITH MICROSCOPIC - Abnormal; Notable for the following components:    Specific Gravity, UA 1.004 (*)     Leukocyte Esterase, Urine TRACE (*)     All other components within normal limits   PREGNANCY, URINE - Abnormal; Notable for the following components:    HCG(Urine) Pregnancy Test POSITIVE (*)     All other components within normal limits   VAGINITIS DNA PROBE   C.TRACHOMATIS N.GONORRHOEAE DNA   CULTURE, URINE       US OB TRANSVAGINAL    Result Date: 2/22/2024  EXAMINATION: FIRST TRIMESTER OBSTETRIC ULTRASOUND 2/22/2024 TECHNIQUE: first trimester obstetric pelvic duplex ultrasound was performed with real-time imaging, color flow Doppler imaging, and spectral analysis. COMPARISON: None HISTORY: ORDERING SYSTEM PROVIDED HISTORY: 4 wks by  x 6.1  x 2.9 mm may represent an early IUP.  Follow-up is recommended.     IMPRESSION:  Given the patient's beta HCG is low, differential considerations include an  early normal pregnancy versus abnormal pregnancy (including  or  ectopic pregnancy).  Continued follow-up beta HCG and ultrasound recommended.   [BL]    Spoke with OB who will come see the patient.  [BL]    Remainder of care signed out to oncoming resident. [BL]    I just spoke with OB/GYN, who states that the patient can be discharged with repeat hCG and follow-up in the OB/GYN clinic [MW]      ED Course User Index  [BL] Ofelia Zuñiga DO  [MW] Donavon Willson MD       OUTSTANDING TASKS / RECOMMENDATIONS:    OBGYN recs  Reassess  Dispo      FINAL IMPRESSION:     1. Less than 8 weeks gestation of pregnancy    2. Pregnancy, location unknown        DISPOSITION:         DISPOSITION:  [x]  Discharge   []  Transfer -    []  Admission -     []  Against Medical Advice   []  Eloped   FOLLOW-UP: Jany Steel, APRN - CNP  [address] [place] [id number] [phone]    Schedule an appointment as soon as possible for a visit       [facility] ED  [facility] [id number] [phone]  Go to   If symptoms worsen    [facility]  [address] [place] [id number] [phone]  Schedule an appointment as soon as possible for a visit        DISCHARGE MEDICATIONS: Discharge Medication List as of 2024  9:45 PM              Donavon Willson MD  Emergency Medicine Resident  [facility]

## 2024-02-27 ENCOUNTER — HOSPITAL ENCOUNTER (OUTPATIENT)
Age: 24
Setting detail: SPECIMEN
Discharge: HOME | End: 2024-02-27

## 2024-02-27 DIAGNOSIS — O36.80X0 PREGNANCY, LOCATION UNKNOWN: ICD-10-CM

## 2024-02-28 LAB — B-HCG SERPL EIA 3RD IS-ACNC: ABNORMAL MIU/ML

## 2024-04-15 ENCOUNTER — FOLLOWUP TELEPHONE ENCOUNTER (OUTPATIENT)
Dept: OBGYN | Age: 24
End: 2024-04-15

## 2024-04-15 ENCOUNTER — SCHEDULED TELEPHONE ENCOUNTER (OUTPATIENT)
Dept: OBGYN | Age: 24
End: 2024-04-15

## 2024-04-15 DIAGNOSIS — Z23 NEED FOR TDAP VACCINATION: ICD-10-CM

## 2024-04-15 DIAGNOSIS — Z83.3 FAMILY HISTORY OF DIABETES MELLITUS IN MOTHER: ICD-10-CM

## 2024-04-15 DIAGNOSIS — Z53.1 BLOOD TRANSFUSION DECLINED DUE TO REASONS OF CONSCIENCE: ICD-10-CM

## 2024-04-15 DIAGNOSIS — Z29.11 NEED FOR RSV VACCINATION: ICD-10-CM

## 2024-04-15 DIAGNOSIS — Z23 NEED FOR INFLUENZA VACCINATION: ICD-10-CM

## 2024-04-15 DIAGNOSIS — G43.909 MIGRAINE WITHOUT STATUS MIGRAINOSUS, NOT INTRACTABLE, UNSPECIFIED MIGRAINE TYPE: ICD-10-CM

## 2024-04-15 DIAGNOSIS — Z28.21 COVID-19 VACCINATION DECLINED: ICD-10-CM

## 2024-04-15 DIAGNOSIS — Z78.9 NO HISTORY OF VARICELLA VACCINATION: ICD-10-CM

## 2024-04-15 DIAGNOSIS — J45.909 MILD REACTIVE AIRWAYS DISEASE, UNSPECIFIED WHETHER PERSISTENT: ICD-10-CM

## 2024-04-15 DIAGNOSIS — G40.909 SEIZURE DISORDER (HCC): ICD-10-CM

## 2024-04-15 DIAGNOSIS — O09.90 HIGH RISK PREGNANCY, ANTEPARTUM: Primary | ICD-10-CM

## 2024-04-15 DIAGNOSIS — Z72.0 VAPES NICOTINE CONTAINING SUBSTANCE: ICD-10-CM

## 2024-04-15 DIAGNOSIS — F12.90 MARIJUANA USE: ICD-10-CM

## 2024-04-15 PROBLEM — E87.6 HYPOKALEMIA: Status: RESOLVED | Noted: 2022-08-18 | Resolved: 2024-04-15

## 2024-04-15 PROBLEM — O36.80X0 PREGNANCY, LOCATION UNKNOWN: Status: RESOLVED | Noted: 2024-02-22 | Resolved: 2024-04-15

## 2024-04-15 PROBLEM — R56.9 MIGRAINE TRIGGERED SEIZURES (HCC): Status: ACTIVE | Noted: 2024-01-31

## 2024-04-15 PROBLEM — G43.109 MIGRAINE TRIGGERED SEIZURES (HCC): Status: ACTIVE | Noted: 2024-01-31

## 2024-04-15 RX ORDER — ASPIRIN 81 MG/1
162 TABLET, CHEWABLE ORAL DAILY
Qty: 60 TABLET | Refills: 5 | Status: SHIPPED | OUTPATIENT
Start: 2024-04-15

## 2024-04-15 RX ORDER — PNV NO.95/FERROUS FUM/FOLIC AC 28MG-0.8MG
1 TABLET ORAL DAILY
Qty: 30 TABLET | Refills: 12 | Status: SHIPPED | OUTPATIENT
Start: 2024-04-15

## 2024-04-15 NOTE — PROGRESS NOTES
Documentation:  I communicated with the patient and/or health care decision maker about the OB intake.   Details of this discussion including any medical advice provided: see notes    Total Time: minutes: 21-30 minutes    Baldo Najera was evaluated through a synchronous (real-time) audio encounter. Patient identification was verified at the start of the visit. She (or guardian if applicable) is aware that this is a billable service, which includes applicable co-pays. This visit was conducted with the patient's (and/or legal guardian's) verbal consent. She has not had a related appointment within my department in the past 7 days or scheduled within the next 24 hours.   The patient was located at Home: [address] [place] [address].  The provider was located at Home (Appt Dept State): Reji SONI.    Note: not billable if this call serves to triage the patient into an appointment for the relevant concern  Yes, I confirm.   Baldo Najera is a 23 y.o. female evaluated via telephone on 4/15/2024 for Other (OB intake)  .        Zoraida Dumont RN    First Trimester Plans/Education completed per ACOG Guidelines.  Pt counseled and verbalizes understanding.   Routine Prenatal Tests,  Risk Factors Identified By Prenatal History, Anticipated Course of Prenatal Care, Nutrition and Weight Gain Counseling, Toxoplasmosis Precautions ( cats/raw meat), Sexual Activity, Exercise, Influenza/Tdap Vaccine, Smoking Counseling, Environmental/Work Hazards, Travel, Alcohol, Illicit/Recreational Drugs, Use Of Any Medications, Indications for Ultrasound, Domestic Violence, Seat Belt Use, Dental Care , Childbirth Classes/Hospital Facilities.     Risk factors for current pregnancy: Nullip; seizure disorder/seizure-like activity; reactive airway disease; pregravid BMI 32; marijuana use; vapes; FHx DM in mother; declines blood transfusion; migraines; fetal ibuprofen, Kepra exposure    Patient occupation: Employed, part

## 2024-04-15 NOTE — TELEPHONE ENCOUNTER
SW spoke with Pt for depression screen and [facility] initial assessment. Pt reported having a very strong support system. Reports cessation of tobacco, alcohol and thc. SW completed lead screen with Pt. No risks detected   Pt scored 0 on PHQ-2. SW educated Pt on safe sleep, infant mortality, smoking cessation. No issues or concerns with MH symptoms, no depression or anxiety. No issues to discuss with SW at this time. Pt will contact [facility]. Pt declined/does not qualify for [facility] referral. Pt informed she will speak with Zoraida DESAI to complete the intake. SW will follow up at 28 weeks and as needed.   Lead screening for pregnant and breast-feeding women.    Do you live in or regularly visit a home built before 1978 that has had renovations, repair work, or remodeling in the past 12 months? No  Have you resided in or emigrated from areas were  contamination is high ([place], [place], [place])? No  Do you live near a manufacturing plant where lead is used e.g. battery manufacturing or recycling, ship building, or plastic manufacturing? No  Do you work with lead or live with someone who does? No  Do you cook with, store or serve food in lead-glazed ceramic pottery? No  Do you eat none food substances that may be contaminated with lead such as soil or lead glazed ceramic pottery? No  Do you use alternative therapies, herbs or home remedies imported from [place], [place], [place] or  countries? No  Do you use imported traditional cosmetics such as chip or surma that may be contaminated with lead? No  Do you or a family member engage in hobbies where lead is used e.g. stained glass or making pottery with lead glaze? No  Has your home been identified as having lead pipes or water source lines with lead? No  Have you ever been told that you have high levels of lead in your body? No  Do you live with someone identified as having elevated lead levels, child, close friend or relative?

## 2024-04-22 ASSESSMENT — PATIENT HEALTH QUESTIONNAIRE - PHQ9
SUM OF ALL RESPONSES TO PHQ QUESTIONS 1-9: 0
2. FEELING DOWN, DEPRESSED OR HOPELESS: NOT AT ALL
SUM OF ALL RESPONSES TO PHQ QUESTIONS 1-9: 0
SUM OF ALL RESPONSES TO PHQ QUESTIONS 1-9: 0
1. LITTLE INTEREST OR PLEASURE IN DOING THINGS: NOT AT ALL
SUM OF ALL RESPONSES TO PHQ9 QUESTIONS 1 & 2: 0
SUM OF ALL RESPONSES TO PHQ QUESTIONS 1-9: 0

## 2024-05-01 ENCOUNTER — HOSPITAL ENCOUNTER (OUTPATIENT)
Age: 24
Setting detail: SPECIMEN
Discharge: HOME | End: 2024-05-01

## 2024-05-01 ENCOUNTER — TELEPHONE (OUTPATIENT)
Dept: OBGYN | Age: 24
End: 2024-05-01

## 2024-05-01 DIAGNOSIS — O09.90 HIGH RISK PREGNANCY, ANTEPARTUM: ICD-10-CM

## 2024-05-01 DIAGNOSIS — Z83.3 FAMILY HISTORY OF DIABETES MELLITUS IN MOTHER: ICD-10-CM

## 2024-05-01 DIAGNOSIS — Z78.9 NO HISTORY OF VARICELLA VACCINATION: ICD-10-CM

## 2024-05-01 LAB
ABO + RH BLD: NORMAL
AMPHET UR QL SCN: NEGATIVE
BARBITURATES UR QL SCN: NEGATIVE
BASOPHILS # BLD: 0.07 K/UL (ref 0–0.2)
BASOPHILS NFR BLD: 1 % (ref 0–2)
BENZODIAZ UR QL: NEGATIVE
BLOOD GROUP ANTIBODIES SERPL: NEGATIVE
CANNABINOIDS UR QL SCN: NEGATIVE
COCAINE UR QL SCN: NEGATIVE
EOSINOPHIL # BLD: 0.26 K/UL (ref 0–0.44)
EOSINOPHILS RELATIVE PERCENT: 3 % (ref 1–4)
ERYTHROCYTE [DISTWIDTH] IN BLOOD BY AUTOMATED COUNT: 15.6 % (ref 11.8–14.4)
FENTANYL UR QL: NEGATIVE
GLUCOSE 1H P 50 G GLC PO SERPL-MCNC: 101 MG/DL (ref 70–135)
GLUCOSE ADMINISTRATION: NORMAL
HBV SURFACE AG SERPL QL IA: NONREACTIVE
HCT VFR BLD AUTO: 37.9 % (ref 36.3–47.1)
HCV AB SERPL QL IA: NONREACTIVE
HGB BLD-MCNC: 12 G/DL (ref 11.9–15.1)
HIV 1+2 AB+HIV1 P24 AG SERPL QL IA: NONREACTIVE
IMM GRANULOCYTES # BLD AUTO: 0.05 K/UL (ref 0–0.3)
IMM GRANULOCYTES NFR BLD: 1 %
LYMPHOCYTES NFR BLD: 2.3 K/UL (ref 1.1–3.7)
LYMPHOCYTES RELATIVE PERCENT: 22 % (ref 25–45)
MCH RBC QN AUTO: 24 PG (ref 25.2–33.5)
MCHC RBC AUTO-ENTMCNC: 31.7 G/DL (ref 28.4–34.8)
MCV RBC AUTO: 75.8 FL (ref 82.6–102.9)
METHADONE UR QL: NEGATIVE
MONOCYTES NFR BLD: 0.71 K/UL (ref 0.1–1.4)
MONOCYTES NFR BLD: 7 % (ref 2–8)
NEUTROPHILS NFR BLD: 66 % (ref 34–64)
NEUTS SEG NFR BLD: 6.88 K/UL (ref 1.5–8.1)
NRBC BLD-RTO: 0 PER 100 WBC
OPIATES UR QL SCN: NEGATIVE
OXYCODONE UR QL SCN: NEGATIVE
PCP UR QL SCN: NEGATIVE
PLATELET # BLD AUTO: 300 K/UL (ref 138–453)
PMV BLD AUTO: 11.4 FL (ref 8.1–13.5)
RBC # BLD AUTO: 5 M/UL (ref 3.95–5.11)
RBC # BLD: ABNORMAL 10*6/UL
RUBV IGG SERPL QL IA: 278 IU/ML
T PALLIDUM AB SER QL IA: NONREACTIVE
TEST INFORMATION: NORMAL
WBC OTHER # BLD: 10.3 K/UL (ref 4.5–13.5)

## 2024-05-01 NOTE — TELEPHONE ENCOUNTER
Patient came in for Paktor genetic testing and prenatal labs.  Patient's questions were answered and she was escorted to the lab. Paktor testing kit was completed with blood specimen, lab orders, face sheet and insurance card were placed in [employer] bag and ready for .

## 2024-05-03 LAB — VZV IGG SER QL IA: 1.59

## 2024-05-06 ENCOUNTER — INITIAL PRENATAL (OUTPATIENT)
Dept: OBGYN | Age: 24
End: 2024-05-06

## 2024-05-06 ENCOUNTER — HOSPITAL ENCOUNTER (OUTPATIENT)
Age: 24
Setting detail: SPECIMEN
Discharge: HOME | End: 2024-05-06

## 2024-05-06 VITALS
DIASTOLIC BLOOD PRESSURE: 76 MMHG | BODY MASS INDEX: 31.83 KG/M2 | HEART RATE: 96 BPM | SYSTOLIC BLOOD PRESSURE: 126 MMHG | WEIGHT: 163 LBS

## 2024-05-06 DIAGNOSIS — F12.90 MARIJUANA USE: ICD-10-CM

## 2024-05-06 DIAGNOSIS — Z3A.15 15 WEEKS GESTATION OF PREGNANCY: ICD-10-CM

## 2024-05-06 DIAGNOSIS — O09.92 HIGH-RISK PREGNANCY IN SECOND TRIMESTER: Primary | ICD-10-CM

## 2024-05-06 DIAGNOSIS — G40.909 SEIZURE DISORDER (HCC): ICD-10-CM

## 2024-05-06 PROCEDURE — 0500F INITIAL PRENATAL CARE VISIT: CPT

## 2024-05-07 DIAGNOSIS — O09.92 HIGH-RISK PREGNANCY IN SECOND TRIMESTER: ICD-10-CM

## 2024-05-07 LAB
CANDIDA SPECIES: NEGATIVE
GARDNERELLA VAGINALIS: NEGATIVE
SOURCE: NORMAL
TRICHOMONAS: NEGATIVE

## 2024-05-07 NOTE — PROGRESS NOTES
Attending Physician Statement  I have discussed the care of Baldo Najera, including pertinent history and exam findings,  with the resident. I have reviewed the key elements of all parts of the encounter with the resident.  I agree with the assessment, plan and orders as documented by the resident.  (GE Modifier)    Josette Partida,    
Patient advised to cease use of all vaping products; patient verbalizes understanding.        Reactive airway disease 05/29/2023     04/15/24: Patient denies use of albuterol or nebulizer since the first couple of months after diagnosis.      Seizure disorder (HCC) 08/17/2022     Patient has history of seizure-like activity since 3/2022.     08/18/22: Dr. 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 Patient reports she should be taking 1500mg twice daily but states she stopped taking medications when she found out she was pregnant. Notes from early 2024 state patient reported she had not been taking Kepra due to insurance issues.     Patient reports last seizures in late 2023; patient presented to ED 2/15/24 with complaints of three seizures.     Patient reports her triggers are heat, migraine, working herself too hard, heavy lifting, bright lights.       Return in about 4 weeks (around 6/3/2024) for KEYLA Russell MD  Ob/Gyn Resident  [facility], [facility]  5/6/2024, 2:48 PM    
DC instructions

## 2024-05-08 LAB
C TRACH DNA SPEC QL PROBE+SIG AMP: NEGATIVE
N GONORRHOEA DNA SPEC QL PROBE+SIG AMP: NEGATIVE
SPECIMEN DESCRIPTION: NORMAL

## 2024-05-15 DIAGNOSIS — O09.90 HIGH RISK PREGNANCY, ANTEPARTUM: ICD-10-CM

## 2024-05-15 PROBLEM — Z23 NEED FOR INFLUENZA VACCINATION: Status: RESOLVED | Noted: 2024-04-15 | Resolved: 2024-05-15

## 2024-05-17 LAB — CYTOLOGY REPORT: NORMAL

## 2024-05-20 ENCOUNTER — SCHEDULED TELEPHONE ENCOUNTER (OUTPATIENT)
Dept: OBGYN | Age: 24
End: 2024-05-20
Payer: COMMERCIAL

## 2024-05-20 DIAGNOSIS — R89.9 ABNORMAL LABORATORY TEST RESULT: Primary | ICD-10-CM

## 2024-05-20 PROCEDURE — 99421 OL DIG E/M SVC 5-10 MIN: CPT

## 2024-05-20 NOTE — PROGRESS NOTES
Obstetric/Gynecology Resident Telephone Visit Note    CC/Discussion  Baldo Najera is a 23 y.o. female  at 17 2/7 weeks GA evaluated via telephone on 2024.      Patient requesting a telephone appointment today to discuss the results of her genetic screening (NIPT/Carrier screening).  The patient has a positive carrier screen for sickle cell anemia, and alpha thalassemia both of which are at low fetal risk.  The patient reports that she would desire further testing on the baby's father to determine conferred risk to the baby.  She is scheduled with maternal-fetal medicine on 2024 and may desire consult with Dr. Reynoso the maternal-fetal medicine specialist at that time.  All questions and concerns were addressed the patient's satisfaction today, and she was informed that if she cannot obtain genetic testing for the father she can contact the clinic and screening will be issued to him.    Review of Symptoms:  Constitutional: negative fever, negative chills  HEENT: negative visual disturbances, negative headaches  Respiratory: negative dyspnea, negative cough  Cardiovascular: negative chest pain,  negative palpitations  Gastrointestinal: negative abdominal pain, negative RUQ pain, negative N/V, negative diarrhea, negative constipation  Genitourinary: negative dysuria, negative vaginal discharge, negative vaginal bleeding  Dermatological: negative rash, negative wounds  Hematologic: negative bleeding/clotting disorder  Immunologic: negative recent illness, negative recent sick contact, negative allergic reactions  Lymphatic: negative lymph nodes  Musculoskeletal: negative back pain, negative myalgias, negative arthralgias  Neurological:  negative dizziness, negative weakness  Behavior/Psych: negative depression, negative anxiety    A proper physical examination could not be conducted secondary to nature of the appointment.    Consent:  She and/or health care decision maker is aware that that she may

## 2024-05-21 NOTE — PROGRESS NOTES
Attending Physician Statement  I have discussed the care of Baldo Najera, including pertinent history and exam findings,  with the resident. I have reviewed the key elements of all parts of the encounter with the resident.  I agree with the assessment, plan and orders as documented by the resident.  (GE Modifier)    Josette Partida,

## 2024-06-03 ENCOUNTER — ROUTINE PRENATAL (OUTPATIENT)
Dept: OBGYN | Age: 24
End: 2024-06-03
Payer: COMMERCIAL

## 2024-06-03 ENCOUNTER — HOSPITAL ENCOUNTER (OUTPATIENT)
Age: 24
Setting detail: SPECIMEN
Discharge: HOME | End: 2024-06-03

## 2024-06-03 VITALS
SYSTOLIC BLOOD PRESSURE: 113 MMHG | WEIGHT: 173 LBS | HEART RATE: 88 BPM | DIASTOLIC BLOOD PRESSURE: 69 MMHG | BODY MASS INDEX: 33.79 KG/M2

## 2024-06-03 DIAGNOSIS — O09.92 HIGH-RISK PREGNANCY IN SECOND TRIMESTER: Primary | ICD-10-CM

## 2024-06-03 DIAGNOSIS — G40.909 SEIZURE DISORDER (HCC): ICD-10-CM

## 2024-06-03 DIAGNOSIS — Z13.71 SCREENING FOR GENETIC DISEASE CARRIER STATUS: ICD-10-CM

## 2024-06-03 DIAGNOSIS — Z3A.19 19 WEEKS GESTATION OF PREGNANCY: ICD-10-CM

## 2024-06-03 PROBLEM — G40.919 BREAKTHROUGH SEIZURE (HCC): Status: RESOLVED | Noted: 2022-08-17 | Resolved: 2024-06-03

## 2024-06-03 PROBLEM — Z29.11 NEED FOR RSV VACCINATION: Status: RESOLVED | Noted: 2024-04-15 | Resolved: 2024-06-03

## 2024-06-03 PROBLEM — Z3A.15 15 WEEKS GESTATION OF PREGNANCY: Status: RESOLVED | Noted: 2024-05-06 | Resolved: 2024-06-03

## 2024-06-03 PROBLEM — Z28.21 COVID-19 VACCINATION DECLINED: Status: RESOLVED | Noted: 2024-04-15 | Resolved: 2024-06-03

## 2024-06-03 PROCEDURE — 99213 OFFICE O/P EST LOW 20 MIN: CPT

## 2024-06-03 PROCEDURE — 0502F SUBSEQUENT PRENATAL CARE: CPT

## 2024-06-03 SDOH — ECONOMIC STABILITY: FOOD INSECURITY: WITHIN THE PAST 12 MONTHS, YOU WORRIED THAT YOUR FOOD WOULD RUN OUT BEFORE YOU GOT MONEY TO BUY MORE.: NEVER TRUE

## 2024-06-03 SDOH — ECONOMIC STABILITY: FOOD INSECURITY: WITHIN THE PAST 12 MONTHS, THE FOOD YOU BOUGHT JUST DIDN'T LAST AND YOU DIDN'T HAVE MONEY TO GET MORE.: NEVER TRUE

## 2024-06-03 SDOH — ECONOMIC STABILITY: HOUSING INSECURITY
IN THE LAST 12 MONTHS, WAS THERE A TIME WHEN YOU DID NOT HAVE A STEADY PLACE TO SLEEP OR SLEPT IN A SHELTER (INCLUDING NOW)?: NO

## 2024-06-03 SDOH — ECONOMIC STABILITY: INCOME INSECURITY: HOW HARD IS IT FOR YOU TO PAY FOR THE VERY BASICS LIKE FOOD, HOUSING, MEDICAL CARE, AND HEATING?: SOMEWHAT HARD

## 2024-06-03 NOTE — PROGRESS NOTES
Prenatal Visit    Baldo Naejra is a 23 y.o. female  at 19w2d    The patient was seen and evaluated. She has no complaints. She reports positive fetal movements. She denies contractions, vaginal bleeding and leakage of fluid. Signs and symptoms of labor and pre-eclampsia were reviewed with the patient in detail. She is to report any of these if they occur. She currently denies signs or symptoms of pre-eclampsia including headache, vision changes, RUQ pain.    The problem list reflects the active issues addressed during today's visit    Vitals:  BP: 113/69  Weight - Scale: 78.5 kg (173 lb)  Pulse: 88  Fetal HR: 138     PRENATAL LAB RESULTS:   Blood Type/Rh: O pos  Antibody Screen: negative  Hemoglobin, Hematocrit, Platelets: Hgb 12.0/Hct 37.9/Plt 300  Rubella: immune  T. Pallidum, IgG: non-reactive  Hepatitis B Surface Antigen: non-reactive   Hepatitis C Antibody: non-reactive   HIV: non-reactive   Sickle Cell Screen: negative  Gonorrhea: negative  Chlamydia: negative  Urine culture: negative, date: 2024    Early 1 hour Glucose Tolerance Test:  101  1 hour Glucose Tolerance Test: not yet indicated     Group B Strep: not done   Cystic Fibrosis Screen: negative  First Trimester Screen: not done  MSAFP/Multiple Markers: ordered  Non-Invasive Prenatal Testing: low risk for aneuploidy  Anatomy US (24): not yet completed       Assessment & Plan:  Baldo Najera is a 23 y.o. female  at 19w2d   - 28 week labs will be ordered in future visits.   - An 18-22 week anatomy ultrasound has been ordered; scheduled for 24   - MSAFP was ordered   - NIPT was ordered, reviewed and found to be negative    - Aspirin indication: indicated due to High risk factors: none, Moderate risk factors: nulliparity and BMI >30- Rx given    Genetic Carrier   - Patient noted to be alpha thalassemia and beta thalassemia silent carriers during genetic screening    - Fetal risk noted to be LOW RISK    - Patient

## 2024-06-03 NOTE — PROGRESS NOTES
Attending Physician Statement  I have discussed the care of Baldo Najera, including pertinent history and exam findings, with the resident. I have reviewed the key elements of all parts of the encounter with the resident.  I agree with the assessment, plan and orders as documented by the resident.  (GE Modifier)    Margo Keller [facility], [facility]  6/3/2024, 2:19 PM       ANTICOAGULATION FOLLOW-UP CLINIC VISIT    Patient Name:  Amira Avery  Date:  9/20/2018  Contact Type:  Telephone    SUBJECTIVE:     Patient Findings     Positives No Problem Findings    Comments Spoke to Amira.  She was on Lovenox.  Instructed her to discontinue.             OBJECTIVE    INR   Date Value Ref Range Status   09/20/2018 2.56 (H) 0.86 - 1.14 Final       ASSESSMENT / PLAN  INR assessment THER    Recheck INR In: 4 DAYS    INR Location Clinic      Anticoagulation Summary as of 9/20/2018     INR goal 2.0-3.0   Today's INR 2.56   Warfarin maintenance plan No maintenance plan   Full warfarin instructions 9/20: 5 mg; 9/21: 5 mg; 9/22: 5 mg; 9/23: 5 mg; Otherwise No maintenance plan   Next INR check 9/24/2018   Target end date 12/10/2018    Indications   Deep vein thrombosis (DVT) (H) [I82.409] [I82.409]         Anticoagulation Episode Summary     INR check location     Preferred lab     Send INR reminders to [facility]    Comments pt has a port r/t oncology dx -  has frequent lab draws  +++3 months of coumadin therapy - last dose on 12/10/18+++      Anticoagulation Care Providers     Provider Role Specialty Phone number    Diana Donovan PA-C Responsible Physician Assistant [phone]            See the Encounter Report to view Anticoagulation Flowsheet and Dosing Calendar (Go to Encounters tab in chart review, and find the Anticoagulation Therapy Visit)    Spoke with patient. Gave them their lab results and new warfarin recommendation.  No changes in health, medication, or diet. No missed doses, no falls. No signs or symptoms of bleed or clotting.    Rufus Riley RN

## 2024-06-12 ENCOUNTER — ROUTINE PRENATAL (OUTPATIENT)
Dept: PERINATAL CARE | Age: 24
End: 2024-06-12
Payer: COMMERCIAL

## 2024-06-12 VITALS
HEART RATE: 97 BPM | TEMPERATURE: 98.7 F | DIASTOLIC BLOOD PRESSURE: 67 MMHG | SYSTOLIC BLOOD PRESSURE: 109 MMHG | WEIGHT: 174.82 LBS | BODY MASS INDEX: 33.01 KG/M2 | HEIGHT: 61 IN | RESPIRATION RATE: 16 BRPM

## 2024-06-12 DIAGNOSIS — O99.352 SEIZURE DISORDER DURING PREGNANCY IN SECOND TRIMESTER (HCC): Primary | ICD-10-CM

## 2024-06-12 DIAGNOSIS — Z36.86 ENCOUNTER FOR SCREENING FOR RISK OF PRE-TERM LABOR: ICD-10-CM

## 2024-06-12 DIAGNOSIS — G40.909 SEIZURE DISORDER DURING PREGNANCY IN SECOND TRIMESTER (HCC): Primary | ICD-10-CM

## 2024-06-12 DIAGNOSIS — O99.212 OBESITY AFFECTING PREGNANCY IN SECOND TRIMESTER, UNSPECIFIED OBESITY TYPE: ICD-10-CM

## 2024-06-12 DIAGNOSIS — O28.5 ABNORMAL GENETIC TEST DURING PREGNANCY: ICD-10-CM

## 2024-06-12 DIAGNOSIS — Z3A.20 20 WEEKS GESTATION OF PREGNANCY: ICD-10-CM

## 2024-06-12 PROCEDURE — 76811 OB US DETAILED SNGL FETUS: CPT | Performed by: OBSTETRICS & GYNECOLOGY

## 2024-06-12 PROCEDURE — 76817 TRANSVAGINAL US OBSTETRIC: CPT | Performed by: OBSTETRICS & GYNECOLOGY

## 2024-06-12 PROCEDURE — 99204 OFFICE O/P NEW MOD 45 MIN: CPT | Performed by: OBSTETRICS & GYNECOLOGY

## 2024-06-13 DIAGNOSIS — Z13.71 SCREENING FOR GENETIC DISEASE CARRIER STATUS: Primary | ICD-10-CM

## 2024-06-13 DIAGNOSIS — G40.909 SEIZURE DISORDER (HCC): Primary | ICD-10-CM

## 2024-06-13 DIAGNOSIS — O28.5 ABNORMAL GENETIC TEST DURING PREGNANCY: ICD-10-CM

## 2024-06-13 DIAGNOSIS — O09.92 HIGH-RISK PREGNANCY IN SECOND TRIMESTER: ICD-10-CM

## 2024-07-02 NOTE — PROGRESS NOTES
Prenatal Visit    Baldo Najera is a 23 y.o. female  at 23w4d    The patient was seen and evaluated. She complains of being uncomfortable when falling asleep. She reports positive fetal movements. She denies contractions, vaginal bleeding and leakage of fluid. Signs and symptoms of labor and pre-eclampsia were reviewed with the patient in detail. She is to report any of these if they occur. She currently denies signs or symptoms of pre-eclampsia including headache, vision changes, RUQ pain. She denies any recent seizures or concerns.    The problem list reflects the active issues addressed during today's visit.    Vitals:  Vitals:    24 1416   BP: 131/82   Pulse: 95   Weight: 83.5 kg (184 lb 1.6 oz)      BP: 131/82  Weight - Scale: 83.5 kg (184 lb 1.6 oz)  Pulse: 95  Albumin: Negative  Glucose: Negative     PRENATAL LAB RESULTS:   Blood Type/Rh: O pos  Antibody Screen: negative  Hemoglobin, Hematocrit, Platelets: Hgb 12/Hct 37.9/Plt 300  Rubella: immune  T. Pallidum, IgG: non-reactive  Hepatitis B Surface Antigen: non-reactive   Hepatitis C Antibody: non-reactive   HIV: non-reactive   Sickle Cell Screen: negative  Gonorrhea: negative  Chlamydia: negative  Urine culture: negative, date: 24    Early 1 hour Glucose Tolerance Test: 101  1 hour Glucose Tolerance Test: not done    Group B Strep: not done   Cystic Fibrosis Screen: negative  First Trimester Screen: not done  MSAFP/Multiple Markers: not done  Non-Invasive Prenatal Testing: low risk for aneuploidy  Anatomy US (2024): Left lateral placenta, 3VC normal insertion, Male, normal anatomy      Assessment & Plan:  Baldo Najera is a 23 y.o. female  at 23w4d   - 28 week labs to be ordered at next visit, including CBC, 1 hr GTT, U/A C&S   - An 18-22 week anatomy ultrasound has been completed   - NIPT was ordered and found to be low risk for aneuploidy   - Aspirin indication: indicated due to High risk factors: none, Moderate risk

## 2024-07-03 ENCOUNTER — ROUTINE PRENATAL (OUTPATIENT)
Dept: OBGYN | Age: 24
End: 2024-07-03

## 2024-07-03 VITALS
HEART RATE: 95 BPM | DIASTOLIC BLOOD PRESSURE: 82 MMHG | SYSTOLIC BLOOD PRESSURE: 131 MMHG | BODY MASS INDEX: 34.79 KG/M2 | WEIGHT: 184.1 LBS

## 2024-07-03 DIAGNOSIS — O09.92 HIGH-RISK PREGNANCY IN SECOND TRIMESTER: Primary | ICD-10-CM

## 2024-07-03 DIAGNOSIS — Z3A.23 23 WEEKS GESTATION OF PREGNANCY: ICD-10-CM

## 2024-07-31 ENCOUNTER — ROUTINE PRENATAL (OUTPATIENT)
Dept: PERINATAL CARE | Age: 24
End: 2024-07-31
Payer: COMMERCIAL

## 2024-07-31 VITALS
RESPIRATION RATE: 16 BRPM | SYSTOLIC BLOOD PRESSURE: 112 MMHG | HEART RATE: 89 BPM | WEIGHT: 188.49 LBS | TEMPERATURE: 98.2 F | DIASTOLIC BLOOD PRESSURE: 63 MMHG | BODY MASS INDEX: 35.59 KG/M2 | HEIGHT: 61 IN

## 2024-07-31 DIAGNOSIS — O99.213 OBESITY AFFECTING PREGNANCY IN THIRD TRIMESTER, UNSPECIFIED OBESITY TYPE: ICD-10-CM

## 2024-07-31 DIAGNOSIS — Z3A.27 27 WEEKS GESTATION OF PREGNANCY: ICD-10-CM

## 2024-07-31 DIAGNOSIS — O28.5 ABNORMAL GENETIC TEST DURING PREGNANCY: ICD-10-CM

## 2024-07-31 DIAGNOSIS — Z36.4 ULTRASOUND FOR ANTENATAL SCREENING FOR FETAL GROWTH RESTRICTION: ICD-10-CM

## 2024-07-31 DIAGNOSIS — O99.353 SEIZURE DISORDER DURING PREGNANCY IN THIRD TRIMESTER (HCC): Primary | ICD-10-CM

## 2024-07-31 DIAGNOSIS — G40.909 SEIZURE DISORDER DURING PREGNANCY IN THIRD TRIMESTER (HCC): Primary | ICD-10-CM

## 2024-07-31 DIAGNOSIS — Z36.3 ENCOUNTER FOR ROUTINE SCREENING FOR MALFORMATION USING ULTRASONICS: ICD-10-CM

## 2024-07-31 PROCEDURE — 76827 ECHO EXAM OF FETAL HEART: CPT | Performed by: OBSTETRICS & GYNECOLOGY

## 2024-07-31 PROCEDURE — 76825 ECHO EXAM OF FETAL HEART: CPT | Performed by: OBSTETRICS & GYNECOLOGY

## 2024-07-31 PROCEDURE — 99999 PR OFFICE/OUTPT VISIT,PROCEDURE ONLY: CPT | Performed by: OBSTETRICS & GYNECOLOGY

## 2024-07-31 PROCEDURE — 93325 DOPPLER ECHO COLOR FLOW MAPG: CPT | Performed by: OBSTETRICS & GYNECOLOGY

## 2024-07-31 PROCEDURE — 76816 OB US FOLLOW-UP PER FETUS: CPT | Performed by: OBSTETRICS & GYNECOLOGY

## 2024-07-31 PROCEDURE — 76819 FETAL BIOPHYS PROFIL W/O NST: CPT | Performed by: OBSTETRICS & GYNECOLOGY

## 2024-07-31 RX ORDER — CYCLOBENZAPRINE HCL 10 MG
10 TABLET ORAL 2 TIMES DAILY PRN
COMMUNITY
Start: 2024-07-14

## 2024-08-01 ENCOUNTER — ROUTINE PRENATAL (OUTPATIENT)
Dept: OBGYN | Age: 24
End: 2024-08-01
Payer: COMMERCIAL

## 2024-08-01 VITALS
SYSTOLIC BLOOD PRESSURE: 110 MMHG | HEART RATE: 93 BPM | WEIGHT: 189 LBS | DIASTOLIC BLOOD PRESSURE: 70 MMHG | BODY MASS INDEX: 35.71 KG/M2

## 2024-08-01 DIAGNOSIS — R82.71 GBS BACTERIURIA: ICD-10-CM

## 2024-08-01 DIAGNOSIS — G40.909 SEIZURE DISORDER (HCC): ICD-10-CM

## 2024-08-01 DIAGNOSIS — Z3A.27 27 WEEKS GESTATION OF PREGNANCY: Primary | ICD-10-CM

## 2024-08-01 PROCEDURE — 99211 OFF/OP EST MAY X REQ PHY/QHP: CPT

## 2024-08-01 PROCEDURE — 0502F SUBSEQUENT PRENATAL CARE: CPT

## 2024-08-01 PROCEDURE — 90715 TDAP VACCINE 7 YRS/> IM: CPT | Performed by: OBSTETRICS & GYNECOLOGY

## 2024-08-05 NOTE — PROGRESS NOTES
Attending Physician Statement  I have discussed the care of Baldo Najera, including pertinent history and exam findings,  with the resident. I have reviewed the key elements of all parts of the encounter with the resident.  I agree with the assessment, plan and orders as documented by the resident.  (GE Modifier)    Josette Partida,    
thalassemia silent carrier aa/a- and beta thalassemia c -.79A>G mutation.     FOB not yet tested      High-risk pregnancy in second trimester 04/15/2024     04/15/24: MFM referral placed for anatomy scan and, if indicated, consult.       Pregravid BMI 32.22 04/15/2024     04/15/24: The patient was counseled about weight gain in pregnancy, with a recommended weight gain of 11 to 20 pounds based on the pt's pregravid BMI of 32.25. Reviewed potential complications of obesity in pregnancy. One hour glucose tolerance test ordered for early diabetic screening (GA 12w2d at time of order). MFM referral placed for anatomy scan and, if indicated, consult. Patient verbalizes understanding.       Patient declines blood transfusion 04/15/2024    Migraines 04/15/2024     04/15/24: Patient reports history of migraines, including migraines as seizure trigger. Reports last migraines in January 2024, for which she was taking ibuprofen and naproxen.      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 Patient advised to discontinue use of NSAIDs during pregnancy; patient verbalizes understanding.      Marijuana use 04/15/2024     04/15/24: The patient reports her last use of marijuana/Thc vape was 2/22/24 (LMP 1/20/24). The patient was counseled against the use of marijuana/Thc in pregnancy; maternal/Fetal risks reviewed, LOULOU mandate explained. Patient advised to cease use of marijuana and all related products; patient verbalizes understanding.       Need for Tdap vaccination 04/15/2024     04/15/24: Reviewed R/B/A Tdap vaccination in pregnancy. Patient amenable to receiving Tdap vaccination in pregnancy.        FHx DM in mother 04/15/2024     04/15/24: One hour glucose tolerance test ordered for early diabetic screening (GA 12w2d at time of order).       Vapes nicotine, flavoring, Thc 04/15/2024     04/15/24: The patient reports vaping nicotine, flavoring, and Thc from

## 2024-08-16 ENCOUNTER — ROUTINE PRENATAL (OUTPATIENT)
Dept: OBGYN | Age: 24
End: 2024-08-16
Payer: COMMERCIAL

## 2024-08-16 VITALS
HEART RATE: 92 BPM | BODY MASS INDEX: 35.98 KG/M2 | WEIGHT: 190.4 LBS | SYSTOLIC BLOOD PRESSURE: 117 MMHG | DIASTOLIC BLOOD PRESSURE: 74 MMHG

## 2024-08-16 DIAGNOSIS — O09.93 HIGH RISK PREGNANCY CASE MANAGEMENT PATIENT IN THIRD TRIMESTER: Primary | ICD-10-CM

## 2024-08-16 DIAGNOSIS — Z3A.29 29 WEEKS GESTATION OF PREGNANCY: ICD-10-CM

## 2024-08-16 PROCEDURE — 0502F SUBSEQUENT PRENATAL CARE: CPT

## 2024-08-16 PROCEDURE — 99212 OFFICE O/P EST SF 10 MIN: CPT

## 2024-08-16 ASSESSMENT — PATIENT HEALTH QUESTIONNAIRE - PHQ9
SUM OF ALL RESPONSES TO PHQ QUESTIONS 1-9: 0
2. FEELING DOWN, DEPRESSED OR HOPELESS: NOT AT ALL
1. LITTLE INTEREST OR PLEASURE IN DOING THINGS: NOT AT ALL
SUM OF ALL RESPONSES TO PHQ QUESTIONS 1-9: 0
SUM OF ALL RESPONSES TO PHQ9 QUESTIONS 1 & 2: 0

## 2024-08-19 ENCOUNTER — HOSPITAL ENCOUNTER (OUTPATIENT)
Age: 24
Setting detail: SPECIMEN
Discharge: HOME | End: 2024-08-19

## 2024-08-19 DIAGNOSIS — O09.93 HIGH RISK PREGNANCY CASE MANAGEMENT PATIENT IN THIRD TRIMESTER: ICD-10-CM

## 2024-08-19 DIAGNOSIS — Z3A.27 27 WEEKS GESTATION OF PREGNANCY: ICD-10-CM

## 2024-08-19 DIAGNOSIS — Z3A.29 29 WEEKS GESTATION OF PREGNANCY: ICD-10-CM

## 2024-08-19 LAB
BASOPHILS # BLD: 0.04 K/UL (ref 0–0.2)
BASOPHILS NFR BLD: 0 % (ref 0–2)
EOSINOPHIL # BLD: 0.08 K/UL (ref 0–0.44)
EOSINOPHILS RELATIVE PERCENT: 1 % (ref 1–4)
ERYTHROCYTE [DISTWIDTH] IN BLOOD BY AUTOMATED COUNT: 16.1 % (ref 11.8–14.4)
GLUCOSE 1H P 50 G GLC PO SERPL-MCNC: 132 MG/DL (ref 70–135)
GLUCOSE ADMINISTRATION: NORMAL
HCT VFR BLD AUTO: 38.2 % (ref 36.3–47.1)
HGB BLD-MCNC: 12.4 G/DL (ref 11.9–15.1)
IMM GRANULOCYTES # BLD AUTO: 0.08 K/UL (ref 0–0.3)
IMM GRANULOCYTES NFR BLD: 1 %
LYMPHOCYTES NFR BLD: 2.27 K/UL (ref 1.1–3.7)
LYMPHOCYTES RELATIVE PERCENT: 19 % (ref 25–45)
MCH RBC QN AUTO: 24.6 PG (ref 25.2–33.5)
MCHC RBC AUTO-ENTMCNC: 32.5 G/DL (ref 28.4–34.8)
MCV RBC AUTO: 75.8 FL (ref 82.6–102.9)
MONOCYTES NFR BLD: 0.71 K/UL (ref 0.1–1.4)
MONOCYTES NFR BLD: 6 % (ref 2–8)
NEUTROPHILS NFR BLD: 73 % (ref 34–64)
NEUTS SEG NFR BLD: 8.53 K/UL (ref 1.5–8.1)
NRBC BLD-RTO: 0 PER 100 WBC
PLATELET # BLD AUTO: 235 K/UL (ref 138–453)
PMV BLD AUTO: 12.6 FL (ref 8.1–13.5)
RBC # BLD AUTO: 5.04 M/UL (ref 3.95–5.11)
RBC # BLD: ABNORMAL 10*6/UL
T PALLIDUM AB SER QL IA: NONREACTIVE
WBC OTHER # BLD: 11.7 K/UL (ref 4.5–13.5)

## 2024-08-20 PROBLEM — R73.09 ABNORMAL GLUCOSE TOLERANCE TEST: Status: ACTIVE | Noted: 2024-08-20

## 2024-08-20 NOTE — PROGRESS NOTES
Attending Physician Statement  I have discussed the care of Baldo Najera, including pertinent history and exam findings, with the resident. I have reviewed the key elements of all parts of the encounter with the resident.  I agree with the assessment, plan and orders as documented by the resident.  (GE Modifier)    Nivia Brown DO  [facility]  8/16/24 12:29 PM    
maternal/fetal risks reviewed. Patient advised to cease use of all vaping products; patient verbalizes understanding.        Reactive airway disease 05/29/2023     04/15/24: Patient denies use of albuterol or nebulizer since the first couple of months after diagnosis.      Seizure disorder (HCC) 08/17/2022     Patient has history of seizure-like activity since 3/2022.     08/18/22: Dr. 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 Patient reports she should be taking 1500mg twice daily but states she stopped taking medications when she found out she was pregnant. Notes from early 2024 state patient reported she had not been taking Kepra due to insurance issues.     Patient reports last seizures in late 2023; patient presented to ED 2/15/24 with complaints of three seizures.     Patient reports her triggers are heat, migraine, working herself too hard, heavy lifting, bright lights.    8/1/24: Referral placed for neurology to follow up. Reports last seizure in 1/2024. Stopped meds 7-8 months ago       Return in about 2 weeks (around 8/30/2024) for Routine OB .    Desmond Alcantara MD  Ob/Gyn Resident  [facility], [facility]  8/16/2024, 12:21 PM

## 2024-08-29 NOTE — PROGRESS NOTES
Prenatal Visit    Baldo Najera is a 23 y.o. female  at 31w6d    The patient was seen and evaluated. She has no complaints today. She reports positive fetal movements. She denies contractions, vaginal bleeding and leakage of fluid. Signs and symptoms of labor and pre-eclampsia were reviewed with the patient in detail. She is to report any of these if they occur. She currently denies any of these.    The patient is Rh positive and Rhogam is not indicated in this pregnancy.   The patient already received  the T-Dap Vaccine (27-36 weeks) this pregnancy on 24.      The problem list reflects the active issues addressed during today's visit    Vitals:  Vitals:    24 1129   BP: 119/72   Pulse: 91   Weight: 88.9 kg (196 lb)     BP: 119/72  Weight - Scale: 88.9 kg (196 lb)  Pulse: 91  Patient Position: Sitting  Albumin: Negative  Glucose: Negative  Fundal Height (cm): 32 cm  Fetal HR: 125  Movement: Present     PRENATAL LAB RESULTS:   Blood Type/Rh: O pos  Antibody Screen: negative  Hemoglobin, Hematocrit, Platelets: Hgb 12/Hct 37.9/Plt 300  Rubella: immune  T. Pallidum, IgG: non-reactive  Hepatitis B Surface Antigen: non-reactive   Hepatitis C Antibody: non-reactive   HIV: non-reactive   Sickle Cell Screen: positive for B- thalassemia   Gonorrhea: negative  Chlamydia: negative  Urine culture: negative, date: 2024,      Positive GBS <10,000 CFU     Early 1 hour Glucose Tolerance Test:  101  1 hour Glucose Tolerance Test:  132  3 hour Glucose Tolerance Test: ordered, not yet completed by patient.     Group B Strep: positive - bacteruria    Cystic Fibrosis Screen: negative  First Trimester Screen: not available  MSAFP/Multiple Markers: ordered not completed   Non-Invasive Prenatal Testing: low risk for aneuploidy  Anatomy US (24): left lateral placenta, 3VC, male, normal anatomy      Assessment & Plan:  Baldo Najera is a 23 y.o. female  at 31w6d   - 28 week labs in process   - Tdap

## 2024-08-30 ENCOUNTER — ROUTINE PRENATAL (OUTPATIENT)
Dept: OBGYN | Age: 24
End: 2024-08-30

## 2024-08-30 VITALS
SYSTOLIC BLOOD PRESSURE: 119 MMHG | HEART RATE: 91 BPM | WEIGHT: 196 LBS | DIASTOLIC BLOOD PRESSURE: 72 MMHG | BODY MASS INDEX: 37.03 KG/M2

## 2024-08-30 DIAGNOSIS — O09.93 HIGH RISK PREGNANCY CASE MANAGEMENT PATIENT IN THIRD TRIMESTER: Primary | ICD-10-CM

## 2024-08-30 DIAGNOSIS — Z3A.31 31 WEEKS GESTATION OF PREGNANCY: ICD-10-CM

## 2024-08-30 PROCEDURE — 0502F SUBSEQUENT PRENATAL CARE: CPT

## 2024-08-30 RX ORDER — ASPIRIN 81 MG/1
162 TABLET, CHEWABLE ORAL DAILY
Qty: 60 TABLET | Refills: 5 | Status: SHIPPED | OUTPATIENT
Start: 2024-08-30

## 2024-08-30 NOTE — PROGRESS NOTES
Attending Physician Statement  I have discussed the care of Baldo Najera, including pertinent history and exam findings, with the resident. I have seen and examined the patient and the key elements of all parts of the encounter have been performed by me. I agree with the assessment, plan and orders as documented by the resident.  (GC Modifier)    Margo Keller [facility], [facility]  8/30/2024, 11:57 AM  '

## 2024-09-11 ENCOUNTER — ROUTINE PRENATAL (OUTPATIENT)
Dept: PERINATAL CARE | Age: 24
End: 2024-09-11
Payer: COMMERCIAL

## 2024-09-11 VITALS
DIASTOLIC BLOOD PRESSURE: 75 MMHG | TEMPERATURE: 99 F | HEIGHT: 61 IN | RESPIRATION RATE: 16 BRPM | BODY MASS INDEX: 37.29 KG/M2 | HEART RATE: 87 BPM | WEIGHT: 197.53 LBS | SYSTOLIC BLOOD PRESSURE: 127 MMHG

## 2024-09-11 DIAGNOSIS — Z3A.33 33 WEEKS GESTATION OF PREGNANCY: ICD-10-CM

## 2024-09-11 DIAGNOSIS — Z36.3 ENCOUNTER FOR ROUTINE SCREENING FOR MALFORMATION USING ULTRASONICS: ICD-10-CM

## 2024-09-11 DIAGNOSIS — O34.83 OVARIAN CYST DURING PREGNANCY IN THIRD TRIMESTER: ICD-10-CM

## 2024-09-11 DIAGNOSIS — O28.5 ABNORMAL GENETIC TEST DURING PREGNANCY: ICD-10-CM

## 2024-09-11 DIAGNOSIS — O99.353 SEIZURE DISORDER DURING PREGNANCY IN THIRD TRIMESTER (HCC): Primary | ICD-10-CM

## 2024-09-11 DIAGNOSIS — N83.209 OVARIAN CYST DURING PREGNANCY IN THIRD TRIMESTER: ICD-10-CM

## 2024-09-11 DIAGNOSIS — O99.213 OBESITY AFFECTING PREGNANCY IN THIRD TRIMESTER, UNSPECIFIED OBESITY TYPE: ICD-10-CM

## 2024-09-11 DIAGNOSIS — G40.909 SEIZURE DISORDER DURING PREGNANCY IN THIRD TRIMESTER (HCC): Primary | ICD-10-CM

## 2024-09-11 PROBLEM — N83.201 CYSTS OF BOTH OVARIES: Status: ACTIVE | Noted: 2024-09-11

## 2024-09-11 PROBLEM — N83.202 CYSTS OF BOTH OVARIES: Status: ACTIVE | Noted: 2024-09-11

## 2024-09-11 PROCEDURE — 99999 PR OFFICE/OUTPT VISIT,PROCEDURE ONLY: CPT | Performed by: OBSTETRICS & GYNECOLOGY

## 2024-09-11 PROCEDURE — 76819 FETAL BIOPHYS PROFIL W/O NST: CPT | Performed by: OBSTETRICS & GYNECOLOGY

## 2024-09-11 PROCEDURE — 76805 OB US >/= 14 WKS SNGL FETUS: CPT | Performed by: OBSTETRICS & GYNECOLOGY

## 2024-09-13 ENCOUNTER — HOSPITAL ENCOUNTER (OUTPATIENT)
Age: 24
Setting detail: SPECIMEN
Discharge: HOME | End: 2024-09-13

## 2024-09-13 ENCOUNTER — ROUTINE PRENATAL (OUTPATIENT)
Dept: OBGYN | Age: 24
End: 2024-09-13

## 2024-09-13 VITALS
HEART RATE: 92 BPM | WEIGHT: 200 LBS | DIASTOLIC BLOOD PRESSURE: 77 MMHG | SYSTOLIC BLOOD PRESSURE: 118 MMHG | BODY MASS INDEX: 37.79 KG/M2

## 2024-09-13 DIAGNOSIS — O09.93 HIGH RISK PREGNANCY CASE MANAGEMENT PATIENT IN THIRD TRIMESTER: ICD-10-CM

## 2024-09-13 DIAGNOSIS — N83.202 CYSTS OF BOTH OVARIES: ICD-10-CM

## 2024-09-13 DIAGNOSIS — O32.8XX0 OTHER FETAL MALPRESENTATION, SINGLE OR UNSPECIFIED FETUS: ICD-10-CM

## 2024-09-13 DIAGNOSIS — Z3A.33 33 WEEKS GESTATION OF PREGNANCY: ICD-10-CM

## 2024-09-13 DIAGNOSIS — O09.93 HIGH RISK PREGNANCY CASE MANAGEMENT PATIENT IN THIRD TRIMESTER: Primary | ICD-10-CM

## 2024-09-13 DIAGNOSIS — N83.201 CYSTS OF BOTH OVARIES: ICD-10-CM

## 2024-09-13 PROBLEM — O32.9XX0: Status: ACTIVE | Noted: 2024-09-13

## 2024-09-13 PROCEDURE — 0502F SUBSEQUENT PRENATAL CARE: CPT

## 2024-09-16 LAB
MEV IGG SER-ACNC: 3.71
MUV IGG SER IA-ACNC: 1.59

## 2024-09-23 ENCOUNTER — TELEPHONE (OUTPATIENT)
Dept: OBGYN | Age: 24
End: 2024-09-23

## 2024-09-23 ENCOUNTER — HOSPITAL ENCOUNTER (OUTPATIENT)
Age: 24
Setting detail: SPECIMEN
Discharge: HOME | End: 2024-09-23

## 2024-09-23 DIAGNOSIS — R73.09 ABNORMAL GLUCOSE TOLERANCE TEST: ICD-10-CM

## 2024-09-23 DIAGNOSIS — Z3A.27 27 WEEKS GESTATION OF PREGNANCY: ICD-10-CM

## 2024-09-23 DIAGNOSIS — O09.93 HIGH RISK PREGNANCY CASE MANAGEMENT PATIENT IN THIRD TRIMESTER: Primary | ICD-10-CM

## 2024-09-23 DIAGNOSIS — O09.93 HIGH RISK PREGNANCY CASE MANAGEMENT PATIENT IN THIRD TRIMESTER: ICD-10-CM

## 2024-09-24 LAB
AMOUNT GLUCOSE GIVEN: NORMAL G
GLUCOSE 2H P 75 G GLC PO SERPL-MCNC: 57 MG/DL
GLUCOSE TOLERANCE TEST 1 HOUR: 126 MG/DL
GLUCOSE TOLERANCE TEST 2 HOUR: 106 MG/DL
GLUCOSE TOLERANCE TEST 3 HOUR: 73 MG/DL

## 2024-09-26 PROBLEM — O09.92 HIGH-RISK PREGNANCY IN SECOND TRIMESTER: Status: RESOLVED | Noted: 2024-04-15 | Resolved: 2024-09-26

## 2024-09-26 PROBLEM — Z23 NEED FOR TDAP VACCINATION: Status: RESOLVED | Noted: 2024-04-15 | Resolved: 2024-09-26

## 2024-09-26 NOTE — PROGRESS NOTES
Prenatal Visit    Baldo Najera is a 23 y.o. female  at 35w6d    The patient was seen and evaluated. She is complaining of low back and hip discomfort that keeps her up at night sometimes. She reports positive fetal movements. She denies contractions, vaginal bleeding and leakage of fluid. Signs and symptoms of labor and pre-eclampsia were reviewed with the patient in detail. She is to report any of these if they occur. She currently denies any signs or symptoms of pre-clampsia including headache, vision changes, RUQ pain.    The patient is Rh positive and Rhogam is not indicated in this pregnancy  The patient already received  the T-Dap Vaccine (27-36 weeks) this pregnancy.   The patient is requesting the influenza vaccine this year.   The patient was counseled on benefits of receiving RSV vaccination between 32-36 weeks and desires    The problem list reflects the active issues addressed during today's visit.    Allergies:  No Known Allergies    Vitals:    BP: 125/79  Weight - Scale: 92.5 kg (204 lb)  Pulse: 92  Patient Position: Sitting  Albumin: Negative  Glucose: Negative  Fundal Height (cm): 37 cm  Fetal HR: 148  Movement: Present     PRENATAL LAB RESULTS:   Blood Type/Rh: O pos  Antibody Screen: negative  Hemoglobin, Hematocrit, Platelets: Hgb 12/Hct 37.9/Plt 300  Rubella: immune  Varicella: immune  T. Pallidum, IgG: non-reactive  Hepatitis B Surface Antigen: non-reactive   Hepatitis C Antibody: non-reactive   HIV: non-reactive   Sickle Cell Screen: positive for B-thalassemia mutation  Gonorrhea: negative  Chlamydia: negative  Trichomonas: negative  Urine culture: negative, date: 2024, + GBS <10,000 CFU 2024     Early 1 hour Glucose Tolerance Test:  101  1 hour Glucose Tolerance Test:  132  3 hour Glucose Tolerance Test: 57/ 126 / 106 / 73     Group B Strep: bacteriuria 24  Cystic Fibrosis Screen: negative  First Trimester Screen: not done  MSAFP/Multiple Markers: ordered not

## 2024-09-27 ENCOUNTER — ROUTINE PRENATAL (OUTPATIENT)
Dept: OBGYN | Age: 24
End: 2024-09-27
Payer: COMMERCIAL

## 2024-09-27 VITALS
HEART RATE: 92 BPM | BODY MASS INDEX: 38.55 KG/M2 | SYSTOLIC BLOOD PRESSURE: 125 MMHG | WEIGHT: 204 LBS | DIASTOLIC BLOOD PRESSURE: 79 MMHG

## 2024-09-27 DIAGNOSIS — Z34.93 NORMAL PREGNANCY IN THIRD TRIMESTER: Primary | ICD-10-CM

## 2024-09-27 DIAGNOSIS — Z3A.35 35 WEEKS GESTATION OF PREGNANCY: ICD-10-CM

## 2024-09-27 PROCEDURE — 0502F SUBSEQUENT PRENATAL CARE: CPT | Performed by: STUDENT IN AN ORGANIZED HEALTH CARE EDUCATION/TRAINING PROGRAM

## 2024-09-27 PROCEDURE — 90656 IIV3 VACC NO PRSV 0.5 ML IM: CPT | Performed by: STUDENT IN AN ORGANIZED HEALTH CARE EDUCATION/TRAINING PROGRAM

## 2024-09-27 RX ORDER — RESPIRATORY SYNCYTIAL VIRUS VACCINE 120MCG/0.5
0.5 KIT INTRAMUSCULAR ONCE
Qty: 0.5 ML | Refills: 0 | Status: SHIPPED | OUTPATIENT
Start: 2024-09-27 | End: 2024-09-27

## 2024-09-27 NOTE — PROGRESS NOTES
Patient was given Influenza vaccine in the Right Deltoid. Per doctor Selene  NDC# 62788-814-63  LOT# [id number]  Exp date- 5/31/2025  Patient tolerated well without difficulty

## 2024-09-30 NOTE — PROGRESS NOTES
Attending Physician Statement  I have discussed the care of Baldo Najera, including pertinent history and exam findings, with the resident. I have reviewed the key elements of all parts of the encounter with the resident.  I agree with the assessment, plan and orders as documented by the resident.  (GE Modifier)    Margo Keller [facility], [facility]  9/30/2024, 2:49 PM

## 2024-10-07 ENCOUNTER — ROUTINE PRENATAL (OUTPATIENT)
Dept: OBGYN | Age: 24
End: 2024-10-07

## 2024-10-07 VITALS
WEIGHT: 201 LBS | HEART RATE: 95 BPM | DIASTOLIC BLOOD PRESSURE: 82 MMHG | SYSTOLIC BLOOD PRESSURE: 135 MMHG | BODY MASS INDEX: 37.98 KG/M2

## 2024-10-07 DIAGNOSIS — O09.93 HIGH-RISK PREGNANCY IN THIRD TRIMESTER: ICD-10-CM

## 2024-10-07 DIAGNOSIS — Z3A.37 37 WEEKS GESTATION OF PREGNANCY: Primary | ICD-10-CM

## 2024-10-07 PROCEDURE — 0502F SUBSEQUENT PRENATAL CARE: CPT

## 2024-10-07 NOTE — PROGRESS NOTES
Prenatal Visit    Baldo Najera is a 23 y.o. female  at 37w2d    The patient was seen and evaluated. The patient complains of some pelvic pain and contractions that took her to the hospital yesterday. She reports they checked her cervix and called her closed, she was discharged without complication. . There was positive fetal movements. Today, she denies contractions, vaginal bleeding and leakage of fluid. Signs and symptoms of labor were reviewed.  The S/S of Pre-Eclampsia were reviewed with the patient in detail. She is to report any of these if they occur. She currently denies any signs or symptoms of pre-eclampsia which include headache, vision changes, RUQ pain.    The patient already received the T-Dap Vaccine (27-36 weeks) this pregnancy on 24.   The patient is Rh + and Rhogam is not indicated in this pregnancy  The patient already received the influenza vaccine this year on 24.   The patient declined the COVID-19 vaccine this year.   She received the RSV vaccine on 24    Allergies:  Patient has no known allergies.    Vitals and physical exam:  BP: 135/82  Weight - Scale: 91.2 kg (201 lb)  Pulse: 95  Albumin: Negative  Glucose: Negative  Fundal Height (cm): 37 cm  Fetal HR: 157  Movement: Present     Assessment & Plan:  Baldo Najera is a 23 y.o. female  at 37w2d   - GBS RV culture: GBS bacteruria on 24   - Tdap vaccination:    - Influenza vaccination: completed 24   - Rhogam: not indicated   - COVID-19 vaccination: R/B/A discussed with increased risk of both maternal and fetal morbidity and mortality in unvaccinated pregnant patients who contract COVID-19- patient declined today   -  testing indication: not indicate   - Indications for  section: none   - Patient was screened for signs of labor and s/sx of PreEclampsia. Recommendations when to call or present to the hospital if she experiences signs or symptoms of labor and pre-eclampsia were

## 2024-10-09 ENCOUNTER — ROUTINE PRENATAL (OUTPATIENT)
Dept: PERINATAL CARE | Age: 24
End: 2024-10-09
Payer: COMMERCIAL

## 2024-10-09 VITALS
HEIGHT: 61 IN | HEART RATE: 102 BPM | DIASTOLIC BLOOD PRESSURE: 67 MMHG | TEMPERATURE: 98.3 F | SYSTOLIC BLOOD PRESSURE: 108 MMHG | RESPIRATION RATE: 16 BRPM | BODY MASS INDEX: 38.71 KG/M2 | WEIGHT: 205.03 LBS

## 2024-10-09 DIAGNOSIS — G40.909 SEIZURE DISORDER DURING PREGNANCY IN THIRD TRIMESTER (HCC): ICD-10-CM

## 2024-10-09 DIAGNOSIS — G40.909 SEIZURE DISORDER (HCC): ICD-10-CM

## 2024-10-09 DIAGNOSIS — F12.90 MARIJUANA USE: Primary | ICD-10-CM

## 2024-10-09 DIAGNOSIS — O99.353 SEIZURE DISORDER DURING PREGNANCY IN THIRD TRIMESTER (HCC): ICD-10-CM

## 2024-10-09 DIAGNOSIS — Z72.0 VAPES NICOTINE CONTAINING SUBSTANCE: ICD-10-CM

## 2024-10-09 PROCEDURE — 99999 PR OFFICE/OUTPT VISIT,PROCEDURE ONLY: CPT | Performed by: OBSTETRICS & GYNECOLOGY

## 2024-10-09 PROCEDURE — 76819 FETAL BIOPHYS PROFIL W/O NST: CPT | Performed by: OBSTETRICS & GYNECOLOGY

## 2024-10-09 PROCEDURE — 76816 OB US FOLLOW-UP PER FETUS: CPT | Performed by: OBSTETRICS & GYNECOLOGY

## 2024-10-14 ENCOUNTER — HOSPITAL ENCOUNTER (INPATIENT)
Age: 24
LOS: 3 days | Discharge: HOME | End: 2024-10-17
Attending: OBSTETRICS & GYNECOLOGY | Admitting: OBSTETRICS & GYNECOLOGY
Payer: COMMERCIAL

## 2024-10-14 ENCOUNTER — ROUTINE PRENATAL (OUTPATIENT)
Dept: OBGYN | Age: 24
End: 2024-10-14

## 2024-10-14 VITALS
DIASTOLIC BLOOD PRESSURE: 87 MMHG | HEART RATE: 89 BPM | WEIGHT: 208 LBS | BODY MASS INDEX: 39.3 KG/M2 | SYSTOLIC BLOOD PRESSURE: 132 MMHG

## 2024-10-14 DIAGNOSIS — R82.71 GBS BACTERIURIA: ICD-10-CM

## 2024-10-14 DIAGNOSIS — R73.09 ABNORMAL GLUCOSE TOLERANCE TEST: ICD-10-CM

## 2024-10-14 DIAGNOSIS — O09.93 HIGH-RISK PREGNANCY IN THIRD TRIMESTER: Primary | ICD-10-CM

## 2024-10-14 DIAGNOSIS — R03.0 ELEVATED BP WITHOUT DIAGNOSIS OF HYPERTENSION: ICD-10-CM

## 2024-10-14 DIAGNOSIS — Z3A.38 38 WEEKS GESTATION OF PREGNANCY: ICD-10-CM

## 2024-10-14 LAB
ABO + RH BLD: NORMAL
ALBUMIN SERPL-MCNC: 3.3 G/DL (ref 3.5–5.2)
ALBUMIN/GLOB SERPL: 1 {RATIO} (ref 1–2.5)
ALP SERPL-CCNC: 229 U/L (ref 35–104)
ALT SERPL-CCNC: 11 U/L (ref 10–35)
AMPHET UR QL SCN: NEGATIVE
ANION GAP SERPL CALCULATED.3IONS-SCNC: 11 MMOL/L (ref 9–16)
ARM BAND NUMBER: NORMAL
AST SERPL-CCNC: 21 U/L (ref 10–35)
BARBITURATES UR QL SCN: NEGATIVE
BASOPHILS # BLD: 0.05 K/UL (ref 0–0.2)
BASOPHILS NFR BLD: 1 % (ref 0–2)
BENZODIAZ UR QL: NEGATIVE
BILIRUB SERPL-MCNC: 0.4 MG/DL (ref 0–1.2)
BLOOD BANK SAMPLE EXPIRATION: NORMAL
BLOOD GROUP ANTIBODIES SERPL: NEGATIVE
BUN SERPL-MCNC: 4 MG/DL (ref 6–20)
CALCIUM SERPL-MCNC: 9 MG/DL (ref 8.6–10.4)
CANNABINOIDS UR QL SCN: NEGATIVE
CHLORIDE SERPL-SCNC: 105 MMOL/L (ref 98–107)
CO2 SERPL-SCNC: 20 MMOL/L (ref 20–31)
COCAINE UR QL SCN: NEGATIVE
CREAT SERPL-MCNC: 0.6 MG/DL (ref 0.5–0.9)
CREAT UR-MCNC: 50.1 MG/DL (ref 28–217)
EOSINOPHIL # BLD: 0.07 K/UL (ref 0–0.44)
EOSINOPHILS RELATIVE PERCENT: 1 % (ref 1–4)
ERYTHROCYTE [DISTWIDTH] IN BLOOD BY AUTOMATED COUNT: 16.2 % (ref 11.8–14.4)
FENTANYL UR QL: NEGATIVE
GFR, ESTIMATED: >90 ML/MIN/1.73M2
GLUCOSE SERPL-MCNC: 91 MG/DL (ref 74–99)
HCT VFR BLD AUTO: 42.5 % (ref 36.3–47.1)
HGB BLD-MCNC: 13.3 G/DL (ref 11.9–15.1)
IMM GRANULOCYTES # BLD AUTO: 0.06 K/UL (ref 0–0.3)
IMM GRANULOCYTES NFR BLD: 1 %
LYMPHOCYTES NFR BLD: 2.1 K/UL (ref 1.1–3.7)
LYMPHOCYTES RELATIVE PERCENT: 20 % (ref 25–45)
MCH RBC QN AUTO: 23.9 PG (ref 25.2–33.5)
MCHC RBC AUTO-ENTMCNC: 31.3 G/DL (ref 28.4–34.8)
MCV RBC AUTO: 76.3 FL (ref 82.6–102.9)
METHADONE UR QL: NEGATIVE
MONOCYTES NFR BLD: 1.04 K/UL (ref 0.1–1.4)
MONOCYTES NFR BLD: 10 % (ref 2–8)
NEUTROPHILS NFR BLD: 68 % (ref 34–64)
NEUTS SEG NFR BLD: 7.1 K/UL (ref 1.5–8.1)
NRBC BLD-RTO: 0 PER 100 WBC
OPIATES UR QL SCN: NEGATIVE
OXYCODONE UR QL SCN: NEGATIVE
PCP UR QL SCN: NEGATIVE
PLATELET # BLD AUTO: ABNORMAL K/UL (ref 138–453)
PLATELET, FLUORESCENCE: 212 K/UL (ref 138–453)
PLATELETS.RETICULATED NFR BLD AUTO: 10.1 % (ref 1.1–10.3)
POTASSIUM SERPL-SCNC: 3.4 MMOL/L (ref 3.7–5.3)
PROT SERPL-MCNC: 6.5 G/DL (ref 6.6–8.7)
RBC # BLD AUTO: 5.57 M/UL (ref 3.95–5.11)
RBC # BLD: ABNORMAL 10*6/UL
SODIUM SERPL-SCNC: 136 MMOL/L (ref 136–145)
T PALLIDUM AB SER QL IA: NONREACTIVE
TEST INFORMATION: NORMAL
TOTAL PROTEIN, URINE: 7 MG/DL
URINE TOTAL PROTEIN CREATININE RATIO: 0.14
WBC OTHER # BLD: 10.4 K/UL (ref 4.5–13.5)

## 2024-10-14 PROCEDURE — 0502F SUBSEQUENT PRENATAL CARE: CPT | Performed by: STUDENT IN AN ORGANIZED HEALTH CARE EDUCATION/TRAINING PROGRAM

## 2024-10-14 PROCEDURE — 36415 COLL VENOUS BLD VENIPUNCTURE: CPT

## 2024-10-14 PROCEDURE — 86900 BLOOD TYPING SEROLOGIC ABO: CPT

## 2024-10-14 PROCEDURE — 6360000002 HC RX W HCPCS

## 2024-10-14 PROCEDURE — 86901 BLOOD TYPING SEROLOGIC RH(D): CPT

## 2024-10-14 PROCEDURE — 85025 COMPLETE CBC W/AUTO DIFF WBC: CPT

## 2024-10-14 PROCEDURE — 2580000003 HC RX 258

## 2024-10-14 PROCEDURE — 6370000000 HC RX 637 (ALT 250 FOR IP)

## 2024-10-14 PROCEDURE — 86850 RBC ANTIBODY SCREEN: CPT

## 2024-10-14 PROCEDURE — 80053 COMPREHEN METABOLIC PANEL: CPT

## 2024-10-14 PROCEDURE — 80307 DRUG TEST PRSMV CHEM ANLYZR: CPT

## 2024-10-14 PROCEDURE — 82570 ASSAY OF URINE CREATININE: CPT

## 2024-10-14 PROCEDURE — 85055 RETICULATED PLATELET ASSAY: CPT

## 2024-10-14 PROCEDURE — 84156 ASSAY OF PROTEIN URINE: CPT

## 2024-10-14 PROCEDURE — 86780 TREPONEMA PALLIDUM: CPT

## 2024-10-14 PROCEDURE — 1220000000 HC SEMI PRIVATE OB R&B

## 2024-10-14 RX ORDER — ONDANSETRON 2 MG/ML
4 INJECTION INTRAMUSCULAR; INTRAVENOUS EVERY 6 HOURS PRN
Status: DISCONTINUED | OUTPATIENT
Start: 2024-10-14 | End: 2024-10-14

## 2024-10-14 RX ORDER — ACETAMINOPHEN 500 MG
1000 TABLET ORAL EVERY 6 HOURS PRN
Status: DISCONTINUED | OUTPATIENT
Start: 2024-10-14 | End: 2024-10-14

## 2024-10-14 RX ORDER — ONDANSETRON 4 MG/1
4 TABLET, ORALLY DISINTEGRATING ORAL EVERY 8 HOURS PRN
Status: DISCONTINUED | OUTPATIENT
Start: 2024-10-14 | End: 2024-10-14

## 2024-10-14 RX ORDER — SODIUM CHLORIDE 0.9 % (FLUSH) 0.9 %
5-40 SYRINGE (ML) INJECTION PRN
Status: DISCONTINUED | OUTPATIENT
Start: 2024-10-14 | End: 2024-10-15

## 2024-10-14 RX ORDER — SODIUM CHLORIDE, SODIUM LACTATE, POTASSIUM CHLORIDE, AND CALCIUM CHLORIDE .6; .31; .03; .02 G/100ML; G/100ML; G/100ML; G/100ML
1000 INJECTION, SOLUTION INTRAVENOUS PRN
Status: DISCONTINUED | OUTPATIENT
Start: 2024-10-14 | End: 2024-10-15

## 2024-10-14 RX ORDER — SODIUM CHLORIDE, SODIUM LACTATE, POTASSIUM CHLORIDE, AND CALCIUM CHLORIDE .6; .31; .03; .02 G/100ML; G/100ML; G/100ML; G/100ML
500 INJECTION, SOLUTION INTRAVENOUS PRN
Status: DISCONTINUED | OUTPATIENT
Start: 2024-10-14 | End: 2024-10-15

## 2024-10-14 RX ORDER — ACETAMINOPHEN 500 MG
1000 TABLET ORAL EVERY 6 HOURS PRN
Status: DISCONTINUED | OUTPATIENT
Start: 2024-10-14 | End: 2024-10-15

## 2024-10-14 RX ORDER — SODIUM CHLORIDE 9 MG/ML
INJECTION, SOLUTION INTRAVENOUS PRN
Status: DISCONTINUED | OUTPATIENT
Start: 2024-10-14 | End: 2024-10-15

## 2024-10-14 RX ORDER — SODIUM CHLORIDE 0.9 % (FLUSH) 0.9 %
5-40 SYRINGE (ML) INJECTION EVERY 12 HOURS SCHEDULED
Status: DISCONTINUED | OUTPATIENT
Start: 2024-10-14 | End: 2024-10-15

## 2024-10-14 RX ADMIN — Medication 25 MCG: at 22:00

## 2024-10-14 RX ADMIN — PENICILLIN G POTASSIUM 5 MILLION UNITS: 5000000 INJECTION, POWDER, FOR SOLUTION INTRAMUSCULAR; INTRAVENOUS at 21:58

## 2024-10-14 RX ADMIN — SODIUM CHLORIDE, PRESERVATIVE FREE 10 ML: 5 INJECTION INTRAVENOUS at 21:45

## 2024-10-14 NOTE — PROGRESS NOTES
Attending Physician Statement  I have discussed the care of Baldo Najera, including pertinent history and exam findings, with the resident. I have reviewed the key elements of all parts of the encounter with the resident.  I agree with the assessment, plan and orders as documented by the resident.  (GE Modifier)    Margo Keller [facility], [facility]  10/14/2024, 5:01 PM    
  Prenatal Visit    Baldo Najera is a 23 y.o. female  at 38w2d    The patient was seen and evaluated. The patient denies any complaints. There was positive fetal movements. She denies contractions, vaginal bleeding and leakage of fluid. Signs and symptoms of labor were reviewed.  The S/S of Pre-Eclampsia were reviewed with the patient in detail. She is to report any of these if they occur. She currently denies any signs or symptoms of pre-eclampsia which include headache, vision changes, RUQ pain.    The patient already received the T-Dap Vaccine (27-36 weeks) this pregnancy on 24.   The patient is Rh positive and Rhogam is not indicated in this pregnancy   The patient already received the influenza vaccine this year.   The patient declined the COVID-19 vaccine this year.     Allergies:  Patient has no known allergies.    Vitals and physical exam:    BP: 132/87  Weight - Scale: 94.3 kg (208 lb)  Pulse: 89  Patient Position: Sitting  Albumin: Negative  Glucose: Negative  Fundal Height (cm): 39 cm  Fetal HR: 130  Movement: Present     Vitals:    10/14/24 1642 10/14/24 1702   BP: (!) 141/91 132/87   Pulse: 88 89   Weight: 94.3 kg (208 lb)        PRENATAL LAB RESULTS:   Blood Type/Rh: O pos  Antibody Screen: negative  Hemoglobin, Hematocrit, Platelets: Hgb 12/Hct 37.9/Plt 300  Rubella: immune  Varicella: immune  T. Pallidum, IgG: non-reactive  Hepatitis B Surface Antigen: non-reactive   Hepatitis C Antibody: non-reactive   HIV: non-reactive   Sickle Cell Screen: positive for B-thalassemia mutation  Gonorrhea: negative  Chlamydia: negative  Trichomonas: negative  Urine culture: negative, date: 2024, + GBS <10,000 CFU 2024     Early 1 hour Glucose Tolerance Test:  101  1 hour Glucose Tolerance Test:  132  3 hour Glucose Tolerance Test: 57/ 126 / 106 / 73     Group B Strep: bacteriuria 24 (Care Everywhere)  Cystic Fibrosis Screen: negative  First Trimester Screen: not done  MSAFP/Multiple 
no

## 2024-10-14 NOTE — H&P
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 Patient reports she should be taking 1500mg twice daily but states she stopped taking medications when she found out she was pregnant. Notes from early 2024 state patient reported she had not been taking Kepra due to insurance issues.     Patient reports last seizures in late 2023; patient presented to ED 2/15/24 with complaints of three seizures.     Patient reports her triggers are heat, migraine, working herself too hard, heavy lifting, bright lights.    8/1/24: Referral placed for neurology to follow up. Reports last seizure in 1/2024. Stopped meds 7-8 months ago         Plan discussed with Dr. Friend, who is agreeable.     Steroids given this admission: No    Risks, benefits, alternatives and possible complications have been discussed in detail with the patient. Admission, and post admission procedures and expectations were discussed in detail. All questions were answered.    Attending's Name: Dr. Phuc Alcantara MD  Ob/Gyn Resident  10/14/2024, 11:32 PM

## 2024-10-14 NOTE — FLOWSHEET NOTE
Pt. Presented to L&D for Pre-E lab workup and High blood pressure in the office.  Pt. Has Positive FM, Neg Bloody-show, Neg SROM, Occasional CTX.

## 2024-10-15 PROCEDURE — 6370000000 HC RX 637 (ALT 250 FOR IP)

## 2024-10-15 PROCEDURE — 1220000000 HC SEMI PRIVATE OB R&B

## 2024-10-15 PROCEDURE — 6360000002 HC RX W HCPCS

## 2024-10-15 PROCEDURE — 10H07YZ INSERTION OF OTHER DEVICE INTO PRODUCTS OF CONCEPTION, VIA NATURAL OR ARTIFICIAL OPENING: ICD-10-PCS | Performed by: OBSTETRICS & GYNECOLOGY

## 2024-10-15 PROCEDURE — 2580000003 HC RX 258

## 2024-10-15 PROCEDURE — 7200000001 HC VAGINAL DELIVERY

## 2024-10-15 PROCEDURE — 10907ZC DRAINAGE OF AMNIOTIC FLUID, THERAPEUTIC FROM PRODUCTS OF CONCEPTION, VIA NATURAL OR ARTIFICIAL OPENING: ICD-10-PCS | Performed by: OBSTETRICS & GYNECOLOGY

## 2024-10-15 PROCEDURE — 0UQMXZZ REPAIR VULVA, EXTERNAL APPROACH: ICD-10-PCS | Performed by: OBSTETRICS & GYNECOLOGY

## 2024-10-15 PROCEDURE — 88307 TISSUE EXAM BY PATHOLOGIST: CPT

## 2024-10-15 PROCEDURE — 3E0P7VZ INTRODUCTION OF HORMONE INTO FEMALE REPRODUCTIVE, VIA NATURAL OR ARTIFICIAL OPENING: ICD-10-PCS | Performed by: OBSTETRICS & GYNECOLOGY

## 2024-10-15 RX ORDER — MORPHINE SULFATE 10 MG/ML
10 INJECTION INTRAVENOUS ONCE
Status: COMPLETED | OUTPATIENT
Start: 2024-10-15 | End: 2024-10-15

## 2024-10-15 RX ORDER — SODIUM CHLORIDE 0.9 % (FLUSH) 0.9 %
5-40 SYRINGE (ML) INJECTION EVERY 12 HOURS SCHEDULED
Status: DISCONTINUED | OUTPATIENT
Start: 2024-10-15 | End: 2024-10-17 | Stop reason: HOSPADM

## 2024-10-15 RX ORDER — ACETAMINOPHEN 500 MG
1000 TABLET ORAL 4 TIMES DAILY PRN
Qty: 30 TABLET | Refills: 1 | Status: SHIPPED | OUTPATIENT
Start: 2024-10-15

## 2024-10-15 RX ORDER — SIMETHICONE 80 MG
80 TABLET,CHEWABLE ORAL EVERY 6 HOURS PRN
Status: DISCONTINUED | OUTPATIENT
Start: 2024-10-15 | End: 2024-10-17 | Stop reason: HOSPADM

## 2024-10-15 RX ORDER — SODIUM CHLORIDE 0.9 % (FLUSH) 0.9 %
5-40 SYRINGE (ML) INJECTION PRN
Status: DISCONTINUED | OUTPATIENT
Start: 2024-10-15 | End: 2024-10-17 | Stop reason: HOSPADM

## 2024-10-15 RX ORDER — SEVOFLURANE 250 ML/250ML
1 LIQUID RESPIRATORY (INHALATION) CONTINUOUS PRN
Status: DISCONTINUED | OUTPATIENT
Start: 2024-10-15 | End: 2024-10-15

## 2024-10-15 RX ORDER — SENNA AND DOCUSATE SODIUM 50; 8.6 MG/1; MG/1
1 TABLET, FILM COATED ORAL 2 TIMES DAILY
Status: DISCONTINUED | OUTPATIENT
Start: 2024-10-15 | End: 2024-10-17 | Stop reason: HOSPADM

## 2024-10-15 RX ORDER — IBUPROFEN 600 MG/1
600 TABLET, FILM COATED ORAL EVERY 6 HOURS
Status: DISCONTINUED | OUTPATIENT
Start: 2024-10-15 | End: 2024-10-17 | Stop reason: HOSPADM

## 2024-10-15 RX ORDER — ONDANSETRON 2 MG/ML
4 INJECTION INTRAMUSCULAR; INTRAVENOUS EVERY 6 HOURS PRN
Status: DISCONTINUED | OUTPATIENT
Start: 2024-10-15 | End: 2024-10-17 | Stop reason: HOSPADM

## 2024-10-15 RX ORDER — LANOLIN ALCOHOL/MO/W.PET/CERES
3 CREAM (GRAM) TOPICAL NIGHTLY PRN
Status: DISCONTINUED | OUTPATIENT
Start: 2024-10-15 | End: 2024-10-15

## 2024-10-15 RX ORDER — SODIUM CHLORIDE 9 MG/ML
INJECTION, SOLUTION INTRAVENOUS PRN
Status: DISCONTINUED | OUTPATIENT
Start: 2024-10-15 | End: 2024-10-17 | Stop reason: HOSPADM

## 2024-10-15 RX ORDER — ONDANSETRON 4 MG/1
4 TABLET, ORALLY DISINTEGRATING ORAL EVERY 6 HOURS PRN
Status: DISCONTINUED | OUTPATIENT
Start: 2024-10-15 | End: 2024-10-15

## 2024-10-15 RX ORDER — ONDANSETRON 2 MG/ML
4 INJECTION INTRAMUSCULAR; INTRAVENOUS EVERY 6 HOURS PRN
Status: DISCONTINUED | OUTPATIENT
Start: 2024-10-15 | End: 2024-10-15

## 2024-10-15 RX ORDER — IBUPROFEN 600 MG/1
600 TABLET, FILM COATED ORAL 4 TIMES DAILY PRN
Qty: 30 TABLET | Refills: 1 | Status: SHIPPED | OUTPATIENT
Start: 2024-10-15

## 2024-10-15 RX ORDER — AMOXICILLIN 250 MG
1 CAPSULE ORAL DAILY
Qty: 30 TABLET | Refills: 0 | Status: SHIPPED | OUTPATIENT
Start: 2024-10-15

## 2024-10-15 RX ORDER — ONDANSETRON 4 MG/1
4 TABLET, ORALLY DISINTEGRATING ORAL EVERY 6 HOURS PRN
Status: DISCONTINUED | OUTPATIENT
Start: 2024-10-15 | End: 2024-10-17 | Stop reason: HOSPADM

## 2024-10-15 RX ORDER — PROCHLORPERAZINE EDISYLATE 5 MG/ML
10 INJECTION INTRAMUSCULAR; INTRAVENOUS ONCE
Status: COMPLETED | OUTPATIENT
Start: 2024-10-15 | End: 2024-10-15

## 2024-10-15 RX ORDER — BISACODYL 10 MG
10 SUPPOSITORY, RECTAL RECTAL DAILY PRN
Status: DISCONTINUED | OUTPATIENT
Start: 2024-10-15 | End: 2024-10-17 | Stop reason: HOSPADM

## 2024-10-15 RX ORDER — SODIUM CHLORIDE, SODIUM LACTATE, POTASSIUM CHLORIDE, CALCIUM CHLORIDE 600; 310; 30; 20 MG/100ML; MG/100ML; MG/100ML; MG/100ML
INJECTION, SOLUTION INTRAVENOUS CONTINUOUS
Status: DISCONTINUED | OUTPATIENT
Start: 2024-10-15 | End: 2024-10-15

## 2024-10-15 RX ORDER — ACETAMINOPHEN 500 MG
1000 TABLET ORAL EVERY 6 HOURS SCHEDULED
Status: DISCONTINUED | OUTPATIENT
Start: 2024-10-16 | End: 2024-10-17 | Stop reason: HOSPADM

## 2024-10-15 RX ADMIN — Medication 2.5 MILLION UNITS: at 14:02

## 2024-10-15 RX ADMIN — MORPHINE SULFATE 10 MG: 10 INJECTION INTRAVENOUS at 13:01

## 2024-10-15 RX ADMIN — Medication 2.5 MILLION UNITS: at 18:28

## 2024-10-15 RX ADMIN — Medication 166.7 ML: at 19:26

## 2024-10-15 RX ADMIN — PROCHLORPERAZINE EDISYLATE 10 MG: 5 INJECTION INTRAMUSCULAR; INTRAVENOUS at 13:01

## 2024-10-15 RX ADMIN — SODIUM CHLORIDE, POTASSIUM CHLORIDE, SODIUM LACTATE AND CALCIUM CHLORIDE: 600; 310; 30; 20 INJECTION, SOLUTION INTRAVENOUS at 16:17

## 2024-10-15 RX ADMIN — IBUPROFEN 600 MG: 600 TABLET, FILM COATED ORAL at 20:53

## 2024-10-15 RX ADMIN — ONDANSETRON 4 MG: 2 INJECTION INTRAMUSCULAR; INTRAVENOUS at 18:51

## 2024-10-15 RX ADMIN — ACETAMINOPHEN 1000 MG: 500 TABLET ORAL at 06:23

## 2024-10-15 RX ADMIN — Medication 25 MCG: at 10:44

## 2024-10-15 RX ADMIN — Medication 25 MCG: at 02:07

## 2024-10-15 RX ADMIN — Medication 2.5 MILLION UNITS: at 10:10

## 2024-10-15 RX ADMIN — Medication 3 MG: at 01:45

## 2024-10-15 RX ADMIN — Medication 25 MCG: at 06:20

## 2024-10-15 RX ADMIN — Medication 1 MILLI-UNITS/MIN: at 16:16

## 2024-10-15 RX ADMIN — Medication 2.5 MILLION UNITS: at 01:45

## 2024-10-15 RX ADMIN — Medication 2.5 MILLION UNITS: at 06:09

## 2024-10-15 ASSESSMENT — PAIN SCALES - GENERAL
PAINLEVEL_OUTOF10: 7
PAINLEVEL_OUTOF10: 8

## 2024-10-15 NOTE — PROGRESS NOTES
Obstetric/Gynecology Resident Interval Note    Tracing reviewed. Baseline 125 bpm, moderate variability, accelerations present, decelerations absent, irregular contractions.     Category 1, continue current management. Will perform SVE @ 0600 and assess if patient desires/needs kaiser balloon at that time.     Corin Servin, DO  OB/GYN Resident, PGY2  [place], [place]  10/15/2024, 4:11 AM

## 2024-10-15 NOTE — PROGRESS NOTES
Patient seen and examined. She is uncomfortable with contractions.     FHT: 150, moderate variability, present accels, early vs variable vs late decels, discussed IUP placement with patient to better differentiate and she is agreeable   TOCO: every 2 minutes   SVE: 5-6/90/0    AROM performed revealing clear fluid. IUPC placed. Continue management of IOL.     Josette Partida DO  10/15/2024, 5:49 PM

## 2024-10-15 NOTE — PROGRESS NOTES
Labor Progress Note    Baldo Najera is a 23 y.o. female  at 38w3d    Tracing reviewed.     Vital Signs:  Vitals:    10/14/24 2100 10/14/24 2200 10/14/24 2349 10/14/24 2350   BP: (!) 141/83 138/86  120/68   Pulse:  86  81   Resp:  18 16    Temp:       TempSrc:       SpO2:       Weight:       Height:             FHT: 150, moderate variability, accelerations present, decelerations absent  Contractions: irregular    Cervical Exam: Deferred    Membranes: Intact  Scalp Electrode in place: absent  Intrauterine Pressure Catheter in Place: absent    Assessment/Plan:  Baldo Najera is a 23 y.o. female  at 38w3d admitted for IOL 2/2 gHTN (new diagnosis)   - GBS bacteruria, Pen G for GBS prophylaxis   - VSS, afebrile   -cEFM/TOCO: category 1   -Patient declining Kaiser balloon at this time and would like to wait until after next Cytotec dose before kaiser attempt.    -Will give additional Cytotec 25 mcg PO x1 at 0200   -s/p Cytotec 25 mcg PO x1   -Continue to monitor    Senior resident updated    Corin Servin DO  Ob/Gyn Resident  10/15/2024, 1:40 AM

## 2024-10-15 NOTE — PROGRESS NOTES
Obstetric/Gynecology Resident Interval Note    Fetal Heart Monitor:  Baseline Heart Rate 120, moderate variability, present accelerations, absent decelerations  [place]: contractions, irregular    Overall reassuring maternal and fetal status at this time. Plan for next dose of Cytotec 25 mcg PO around 1020 if patient's kaiser balloon remains in place.    Yara Allison DO  OB/GYN Resident, PGY2  [place], [place]  10/15/2024, 8:58 AM

## 2024-10-15 NOTE — PROGRESS NOTES
Labor Progress Note    Baldo Najera is a 23 y.o. female  at 38w3d  The patient was seen and examined. Her pain is well controlled. She reports fetal movement is present, complains of contractions, denies loss of fluid, denies vaginal bleeding.       Vital Signs:  Vitals:    10/15/24 0208 10/15/24 0620 10/15/24 0627 10/15/24 0648   BP: 128/66  (!) 159/109 133/86   Pulse: 72  81 66   Resp: 18  18    Temp: 98.1 °F (36.7 °C) 98.8 °F (37.1 °C)     TempSrc: Oral Oral     SpO2:       Weight:       Height:           FHT: 125, moderate variability, accelerations present, decelerations absent  Contractions: irregular, difficult to trace with patient's position and resting on her right side    Chaperone for Intimate Exam: Chaperone was present for entire exam, Chaperone Name: GISELLE Smith  Cervical Exam: /1  Pitocin: @ 0 mu/min    Membranes: Intact  Scalp Electrode in place: absent  Intrauterine Pressure Catheter in Place: absent    Interventions: SVE    Assessment/Plan:  Baldo Najera is a 23 y.o. female  at 38w3d admitted for IOL gHTN (new dx)   - GBS bacteruria, Pen G for GBS prophylaxis   - VSS, afebrile   - cEFM/TOCO    - SVE: /1   - S/p kaiser balloon   - S/p Cytotec 25 PO x4   - Plan to start pitocin   - Discussed AROM with patient and plan to perform once she is feeling more of her contractions and is in a good contraction pattern.   - Continue to monitor closely      Attending updated and in agreement with plan    Yara Allison DO  Ob/Gyn Resident  10/15/2024, 3:09 PM

## 2024-10-15 NOTE — CARE COORDINATION
ANTEPARTUM NOTE    38 weeks gestation of pregnancy [Z3A.38]    Baldo was admitted to L&D on 10/14/24 as an admission from her OB appointment due to elevated BPs @ 38w2d. Due to patient having two elevated BPs >4 hours apart she met criteria for gHTN and decision was made w/patient to continue w/ IOL    OB GYN Provider: [facility]    Will meet with patient after delivery to verify name/address/phone/insurance and discuss discharge planning.     Anticipate DC home 2 nights after vaginal delivery or 4 nights after C/S delivery as long as hemodynamically stable.

## 2024-10-15 NOTE — PROGRESS NOTES
Labor Progress Note    Baldo Najera is a 23 y.o. female  at 38w3d  The patient was seen and examined. Her pain is well controlled. She reports fetal movement is present, denies contractions, denies loss of fluid, denies vaginal bleeding.       Vital Signs:  Vitals:    10/14/24 2200 10/14/24 2349 10/14/24 2350 10/15/24 0208   BP: 138/86  120/68 128/66   Pulse: 86  81 72   Resp: 18 16  18   Temp:    98.1 °F (36.7 °C)   TempSrc:    Oral   SpO2:       Weight:       Height:             FHT: 130, moderate variability, accelerations present, decelerations absent  Contractions: irregular every 5+ minutes    Chaperone for Intimate Exam: Chaperone was present for entire exam, Chaperone Name: GISELLE Gotti  Cervical Exam: 1 cm dilated, 30 effaced, -2 station    Membranes: Intact  Scalp Electrode in place: absent  Intrauterine Pressure Catheter in Place: absent    Interventions: SVE, kaiser placement    Assessment/Plan:  Baldo Najera is a 23 y.o. female  at 38w3d admitted for IOL 2/2 gHTN (new diagnosis)   - GBS bacteruria, Pen G for GBS prophylaxis   - VSS, afebrile   -SVE: /-2   -cEFm/TOCO; reassuring, category 1   -Kaiser balloon placed with 60 cc fluid   -s/p Cytotec 25 mcg PO x2   -Will order additional dose of Cytotec 25 mcg PO now   -Continue to monitor    gHTN   -Bps intermittently elevated and normotensive   -Denies s/sx of preE   -PreE labs wnl, P/C 0.14   -Continue to monitor closely     Senior resident updated    Corin Servin DO  Ob/Gyn Resident  10/15/2024, 6:18 AM

## 2024-10-15 NOTE — L&D DELIVERY NOTE
10/15/2024 19:22:00  Cord Blood Disposition: Lab  Gases Sent?: Yes   Cord Comments:  PROCEDURAL - OBSTETRIC - CORD OBSERVATION   no arterial             Placenta    Date/Time: 10/15/2024 19:25:00  Removal: Spontaneous  Appearance: Intact  Disposition: Pathology       Lacerations    Episiotomy: None  Perineal Lacerations: None  Other Lacerations: labial laceration  Labial Laceration: left Repaired?: No   Number of Repair Packets: 0       Vaginal Counts    Initial Count Personnel: LELIA ARIZA  Initial Count Verified By: DR. GU  Intial Sponge Count: Correct  Intial Instruments Count: Correct   Final Sponges Count: Correct  Final Instruments Count: Correct   Final Count Personnel: DR. GU  Final Count Verified By: DIGNA DESAI  Accurate Final Count?: Yes       Blood Loss  Mother: Baldo Najera #[id number]     Start of Mother's Information      Delivery Blood Loss   Intrapartum & Postpartum: 10/15/24 0721 - 10/16/24 0313    Delivery Admission: 10/14/24 1716 - 10/16/24 0313         Intrapartum & Postpartum Delivery Admission    Quantitative Blood Loss (mL) Hospital Encounter 70 grams 70 grams    Total  70 mL 70 mL               End of Mother's Information  Mother: Baldo Najera #[id number]                Delivery Providers    Delivering clinician: Pilar Goode APRN - ARCHANA     Provider Role    Josette Partida DO ObstetricAna María Eddy, RN Primary Nurse    Paradise Felix RN Primary  Nurse     NICU Nurse     Neonatologist     Anesthesiologist     Nurse Anesthetist     Nurse Practitioner     Midwife     Nursery Nurse     Respiratory Therapist     Scrub Tech     Assistant Surgeon              [place] Assessment    Living Status: Living        Skin Color:   Heart Rate:   Reflex Irritability:   Muscle Tone:   Respiratory Effort:   Total:            1 Minute:    0    2    2    2    2    8         5 Minute:    1    2    2    2    2    9                                        Apgars Assigned

## 2024-10-16 PROCEDURE — 2580000003 HC RX 258

## 2024-10-16 PROCEDURE — 1220000000 HC SEMI PRIVATE OB R&B

## 2024-10-16 PROCEDURE — 6370000000 HC RX 637 (ALT 250 FOR IP)

## 2024-10-16 RX ADMIN — ACETAMINOPHEN 1000 MG: 500 TABLET ORAL at 00:52

## 2024-10-16 RX ADMIN — SENNOSIDES AND DOCUSATE SODIUM 1 TABLET: 50; 8.6 TABLET ORAL at 00:19

## 2024-10-16 RX ADMIN — SODIUM CHLORIDE, PRESERVATIVE FREE 10 ML: 5 INJECTION INTRAVENOUS at 20:49

## 2024-10-16 RX ADMIN — WITCH HAZEL: 500 SOLUTION RECTAL; TOPICAL at 00:20

## 2024-10-16 RX ADMIN — BENZOCAINE AND LEVOMENTHOL: 200; 5 SPRAY TOPICAL at 00:19

## 2024-10-16 RX ADMIN — ACETAMINOPHEN 1000 MG: 500 TABLET ORAL at 13:30

## 2024-10-16 RX ADMIN — IBUPROFEN 600 MG: 600 TABLET, FILM COATED ORAL at 16:28

## 2024-10-16 RX ADMIN — IBUPROFEN 600 MG: 600 TABLET, FILM COATED ORAL at 22:33

## 2024-10-16 RX ADMIN — ACETAMINOPHEN 1000 MG: 500 TABLET ORAL at 06:41

## 2024-10-16 RX ADMIN — ACETAMINOPHEN 1000 MG: 500 TABLET ORAL at 20:49

## 2024-10-16 RX ADMIN — SODIUM CHLORIDE, PRESERVATIVE FREE 10 ML: 5 INJECTION INTRAVENOUS at 08:09

## 2024-10-16 RX ADMIN — IBUPROFEN 600 MG: 600 TABLET, FILM COATED ORAL at 08:09

## 2024-10-16 RX ADMIN — SENNOSIDES AND DOCUSATE SODIUM 1 TABLET: 50; 8.6 TABLET ORAL at 08:09

## 2024-10-16 RX ADMIN — SENNOSIDES AND DOCUSATE SODIUM 1 TABLET: 50; 8.6 TABLET ORAL at 20:49

## 2024-10-16 ASSESSMENT — PAIN DESCRIPTION - LOCATION
LOCATION: ABDOMEN

## 2024-10-16 ASSESSMENT — PAIN SCALES - GENERAL
PAINLEVEL_OUTOF10: 1
PAINLEVEL_OUTOF10: 4
PAINLEVEL_OUTOF10: 0
PAINLEVEL_OUTOF10: 1
PAINLEVEL_OUTOF10: 6
PAINLEVEL_OUTOF10: 3
PAINLEVEL_OUTOF10: 3

## 2024-10-16 ASSESSMENT — PAIN DESCRIPTION - DESCRIPTORS
DESCRIPTORS: CRAMPING

## 2024-10-16 ASSESSMENT — PAIN - FUNCTIONAL ASSESSMENT
PAIN_FUNCTIONAL_ASSESSMENT: ACTIVITIES ARE NOT PREVENTED
PAIN_FUNCTIONAL_ASSESSMENT: ACTIVITIES ARE NOT PREVENTED

## 2024-10-16 ASSESSMENT — PAIN DESCRIPTION - ORIENTATION
ORIENTATION: LOWER;MID
ORIENTATION: LOWER

## 2024-10-16 NOTE — PROGRESS NOTES
POST PARTUM DAY # 1    Baldo Najera is a 24 y.o. female  This patient was seen & examined today.     Her pregnancy was complicated by:   Patient Active Problem List   Diagnosis    Seizure disorder    Pregravid BMI 32.22    Patient declines blood transfusion    Reactive airway disease    Migraines    Fetal drug exposure    Marijuana use    FHx DM in mother    Vapes nicotine, flavoring, Thc    Alpha and Beta Thalassemia Carrier    GBS bacteriuria (7/2024)    Elv 1 hr GTT, 3 hour wnl    Cysts of both ovaries    Variable Fetal presentation    Seizure disorder during pregnancy in third trimester (HCC)    Elevated BP without diagnosis of hypertension    Rh+/RI/GBSbacteriuria       Today she is doing well without any chief complaint. Her lochia is light. She denies chest pain, shortness of breath, headache, lightheadedness, and blurred vision. She is pumping and she denies any breast tenderness. She is ambulating well. Her voiding pattern is normal. I reviewed signs and symptoms of post partum depression with the patient, she currently denies any of these symptoms. She is tolerating solids.     Vital Signs:  Vitals:    10/15/24 2131 10/15/24 2147 10/15/24 2200 10/16/24 0230   BP: 122/69 137/77 126/64 115/64   Pulse: 73 80 72 90   Resp: 18 18 18 18   Temp:   98.2 °F (36.8 °C)    TempSrc:   Oral    SpO2:   97% 97%   Weight:       Height:             Physical Exam:  General:  no apparent distress, alert, and cooperative  Neurologic:  alert, oriented, normal speech, no focal findings or movement disorder noted  Lungs:  No increased work of breathing, good air exchange, no conversational dyspnea  Heart:  regular rate and rhythm    Abdomen: abdomen soft, non-distended, non-tender  Fundus: non-tender, normal size, firm, below umbilicus  Extremities:  no calf tenderness, non edematous      Lab:  Lab Results   Component Value Date    HGB 13.3 10/14/2024     Lab Results   Component Value Date    HCT 42.5 10/14/2024

## 2024-10-16 NOTE — CARE COORDINATION
CASE MANAGEMENT POST-PARTUM TRANSITIONAL CARE PLAN    38 weeks gestation of pregnancy [Z3A.38]    OB Provider: [facility]    Writer met w/ Baldo and KAYLA Jorge II at her bedside to discuss DCP. She is S/P  on 10/15/24 @ 38w3d at 1921 of male infant.    Writer verified address/phone number correct on facesheet. She states she lives with her mom Vicky Payton. She denied barriers with transportation home, to doctor's appointments or with paying for medications upon discharge home.     [employer] insurance under her mom is correct. Writer notified her baby cannot be added to this insurance and must be added within 30 days from date of birth to an insurance policy. She verbalized understanding and stated she has applied for Medicaid but was denied. CM asked if she would like to initiate for baby in hospital. She requested someone assist while she is here. CM contacted Mateo in HELP via phone [phone] and LVM.    Infant name on BC: Adrienne Jorge.   Infant PCP [facility].     DME: None  HOME CARE: None    Anticipate DC home of couplet in private vehicle in 1-2 days status post vaginal delivery.      Readmission Risk              Risk of Unplanned Readmission:  6

## 2024-10-16 NOTE — LACTATION NOTE
This note was copied from a baby's chart.  Initiation of Electric Breast Pumping     Pumping Initiated at 0041      Initiated due to    []   Baby in NICU   [x]   Plans exclusive pumping/mother request   []   Infant weight loss(supplement)   []   Baby not latching well    Flange Size    Right:   Left:     [x]   24    [x]   24     []   27    []   27     []   30    []   30     []   36    []   36  Instructions   [x]   Verbal instructions on how to setup pump and how to use initiation phase   [x]   Demo\" How to keep your breast pump kit clean\"   []   Expectation sheet for Breastfeeding mothers with pumping log   [x]   Frequency of pumping   [x]   Collection,labeling and storage of colostrum and milk    Supplies Provided   [x]   Pump initiation kit   [x]   Cleaning supplies (basin and soap)   []   Additional flange size   [x]   Oral syringes/snappies   [x]   Patient labels       -

## 2024-10-16 NOTE — CARE COORDINATION
Social Work     Sw reviewed medical record (current active problem list) and tox screens and found no current concerns.     Sw spoke with mom and fob briefly to explain Sw role, inquire if any needs or concerns, and provide safe sleep education and discuss.  Mom denied any needs or questions and informs baby has a safe sleep environment (bass).     Mom denied any current s/s of anxiety or depression and is aware to reach out to OB if any s/s occur after dc.     Mom reports a really good support system, her mom, salvatore, matthew, and fob's brother present, and denied any current questions or needs.      Mom reports this is her 1st baby.       Mom states ped will be [facility].      Mom is linked with WIC and accepting of [facility] referral, sw submitted.     Sw encouraged parents to reach out if any issues or concerns arise.

## 2024-10-16 NOTE — LACTATION NOTE
This note was copied from a baby's chart.  Writer at bedside to assist with feed. MOB has good positioning in football positioning. Infant hard to console and a stuffy nose. MOB requesting formula. Writer helped calm infant. MOB wishes to feed baby. Writer encouraged hand expression for the time being. Writer hands on teaching MOB and expression of 2 mL of colostrum from one breast. MOB is then requesting a pump due to hand expressing taking too long.

## 2024-10-16 NOTE — FLOWSHEET NOTE
Patient admitted to room 735 from 705 via wheelchair.   Oriented to room and surroundings.  Plan of care reviewed.  Verbalized understanding.  Instructed on infant security and safe sleep practices.  Preventing falls education provided .The following handouts given: A New Beginning: Your Guide to Postpartum Care, Rounding, gs Security System,Babies Cry A lot, Safe Sleep, Security and Visitation Guidelines.   Call light placed within reach.

## 2024-10-16 NOTE — PLAN OF CARE
Problem: Pain  Goal: Verbalizes/displays adequate comfort level or baseline comfort level  10/16/2024 1717 by Luis Manuel Eid RN  Outcome: Progressing  10/16/2024 0414 by Samantha Gold RN  Outcome: Progressing     Problem: Postpartum  Goal: Experiences normal postpartum course  Description:  Postpartum OB-Pregnancy care plan goal which identifies if the mother is experiencing a normal postpartum course  10/16/2024 1717 by Luis Manuel Eid RN  Outcome: Progressing  10/16/2024 0414 by Samantha Gold RN  Outcome: Progressing  Goal: Appropriate maternal -  bonding  Description:  Postpartum OB-Pregnancy care plan goal which identifies if the mother and  are bonding appropriately  10/16/2024 1717 by Luis Manuel Eid RN  Outcome: Progressing  10/16/2024 0414 by Samantha Gold RN  Outcome: Progressing  Goal: Establishment of infant feeding pattern  Description:  Postpartum OB-Pregnancy care plan goal which identifies if the mother is establishing a feeding pattern with their   10/16/2024 1717 by Luis Manuel Eid RN  Outcome: Progressing  10/16/2024 0414 by Samantha Gold RN  Outcome: Progressing     Problem: Discharge Planning  Goal: Discharge to home or other facility with appropriate resources  10/16/2024 1717 by Luis Manuel Eid RN  Outcome: Progressing  10/16/2024 0414 by Samantha Gold RN  Outcome: Progressing

## 2024-10-16 NOTE — CONSULTS
Breastfeeding packet given and reviewed, baby currently at breast in football hold and doing well. Mom providing appropriate support of baby and breast during feeding. Reviewed signs of milk transfer at breast and deep latch,  feeding expectations. Pt has a pump at home if needed. Encouraged lots of skin to skin, frequent attempts, and to call out for assistance as needed.

## 2024-10-17 VITALS
HEIGHT: 61 IN | TEMPERATURE: 98.7 F | SYSTOLIC BLOOD PRESSURE: 127 MMHG | BODY MASS INDEX: 39.27 KG/M2 | WEIGHT: 208 LBS | DIASTOLIC BLOOD PRESSURE: 91 MMHG | OXYGEN SATURATION: 97 % | RESPIRATION RATE: 16 BRPM | HEART RATE: 97 BPM

## 2024-10-17 PROCEDURE — 6370000000 HC RX 637 (ALT 250 FOR IP)

## 2024-10-17 RX ADMIN — IBUPROFEN 600 MG: 600 TABLET, FILM COATED ORAL at 08:48

## 2024-10-17 RX ADMIN — SENNOSIDES AND DOCUSATE SODIUM 1 TABLET: 50; 8.6 TABLET ORAL at 08:47

## 2024-10-17 RX ADMIN — ACETAMINOPHEN 1000 MG: 500 TABLET ORAL at 04:31

## 2024-10-17 ASSESSMENT — PAIN DESCRIPTION - LOCATION: LOCATION: ABDOMEN

## 2024-10-17 ASSESSMENT — PAIN DESCRIPTION - DESCRIPTORS: DESCRIPTORS: CRAMPING

## 2024-10-17 ASSESSMENT — PAIN SCALES - GENERAL
PAINLEVEL_OUTOF10: 3
PAINLEVEL_OUTOF10: 2

## 2024-10-17 NOTE — LACTATION NOTE
Mom reports baby is feeding well at breast.  He was sleepy last night but woke and was more alert around 0200 and has been taking breat well.  Initiated pumping when baby was refusing breast post circumcision, and has two pumps at home if needed.  No needs or concerns offered, discharge instructions and LC follow up reviewed.

## 2024-10-17 NOTE — DISCHARGE INSTRUCTIONS
relieved.  You have persistent burning with urination or frequency.   Call if you have concerns about your well-being.  You are unable to sleep, eat, or are having thoughts of harming yourself or your baby.   You have a red, warm, tender area in you calf.

## 2024-10-17 NOTE — PROGRESS NOTES
POST PARTUM DAY # 2    Baldo Najera is a 24 y.o. female  This patient was seen & examined today.     Her pregnancy was complicated by:   Patient Active Problem List   Diagnosis    Seizure disorder    Pregravid BMI 32.22    Patient declines blood transfusion    Reactive airway disease    Migraines    Fetal drug exposure    Marijuana use    FHx DM in mother    Vapes nicotine, flavoring, Thc    Alpha and Beta Thalassemia Carrier    GBS bacteriuria (7/2024)    Elv 1 hr GTT, 3 hour wnl    Cysts of both ovaries    Variable Fetal presentation    Seizure disorder during pregnancy in third trimester (HCC)    Elevated BP without diagnosis of hypertension    Rh+/RI/GBSbacteriuria       Today she is doing well without any chief complaint. Her lochia is light. She denies chest pain, shortness of breath, headache, lightheadedness, and blurred vision. She is pumping and she denies any breast tenderness. She is ambulating well. Her voiding pattern is normal. I reviewed signs and symptoms of post partum depression with the patient, she currently denies any of these symptoms. She is tolerating solids.     Vital Signs:  Vitals:    10/16/24 0230 10/16/24 0815 10/16/24 1210 10/16/24 1626   BP: 115/64 122/70 128/86 131/84   Pulse: 90 84 94 92   Resp: 18 17 18 17   Temp:  98.3 °F (36.8 °C) 97.9 °F (36.6 °C) 98.2 °F (36.8 °C)   TempSrc:  Oral Oral Oral   SpO2: 97% 98% 98% 99%   Weight:       Height:         Physical Exam:  General:  no apparent distress, alert, and cooperative  Neurologic:  alert, oriented, normal speech, no focal findings or movement disorder noted  Lungs:  No increased work of breathing  Heart:  regular rate  Abdomen: abdomen soft, non-distended, non-tender  Fundus: non-tender, normal size, firm, below umbilicus  Extremities:  no calf tenderness, non edematous    Lab:  Lab Results   Component Value Date    HGB 13.3 10/14/2024     Lab Results   Component Value Date    HCT 42.5 10/14/2024

## 2024-10-17 NOTE — FLOWSHEET NOTE
I have reviewed all [facility] Post-Birth Warning Signs and essential teaching points for pulmonary embolism, cardiac disease, hypertensive disorders of pregnancy, obstetric hemorrhage, venous thromboembolism, infection, and postpartum depression with the patient and FOB (support person) . I have informed the patient on when to call their healthcare provider and when to call 911. I have discussed with the patient  the importance of scheduling a follow-up visit with their physician, nurse practitioner or midwife and provided them with correct contact information for appointment. I have provided the patient with a copy of the \"Save Your Life\" handout. The patient has acknowledged receiving and understanding this education with her signature.

## 2024-10-17 NOTE — DISCHARGE SUMMARY
Obstetric Discharge Summary  [facility]    Patient Name: Baldo Najera  Patient : 2000  Primary Care Physician: Jany Steel APRN - CNP  Admit Date: 10/14/2024    Principal Diagnosis: IUP at 38w2d, admitted for Induction of Labor /2 gHTN      Her pregnancy has been complicated by:   Patient Active Problem List   Diagnosis    Seizure disorder    Pregravid BMI 32.22    Patient declines blood transfusion    Reactive airway disease    Migraines    Fetal drug exposure    Marijuana use    FHx DM in mother    Vapes nicotine, flavoring, Thc    Alpha and Beta Thalassemia Carrier    GBS bacteriuria (2024)    Elv 1 hr GTT, 3 hour wnl    Cysts of both ovaries    Variable Fetal presentation    Seizure disorder during pregnancy in third trimester (HCC)    Elevated BP without diagnosis of hypertension    Rh+/RI/GBSbacteriuria       Infection Present?: No  Hospital Acquired: No    Surgical Operations & Procedures:  Analgesia: none  Delivery Type: Spontaneous Vaginal Delivery: See Labor and Delivery Summary   Laceration(s): Right labial laceration    Consultations: none    Pertinent Findings & Procedures:   Baldo Najera is a 24 y.o. female  at 38w2d presented to triage for blood pressure monitoring and was ultimately admitted for Induction of Labor / gHTN. PreE labs wnl, P/C 0.14. She received Cytotec 25 mcg PO x4, kaiser balloon, Morphine/Compazine x1, Pitocin, AROM, Nitrous, IUPC.     She delivered by spontaneous vaginal a Live Born infant on 10/15/24.       Information for the patient's :  Rakesh Najera [[id number]]   male   Birth Weight: 2.995 kg (6 lb 9.6 oz)    Apgars: 8 at 1 minute and 9 at 5 minutes.       Postpartum course: normal.      Course of patient: uncomplicated    Discharge to: Home    Readmission planned: no     Indication for 6 week PP 2 hour GTT?: no     Eligible for 2 week PP virtual visit? no - gHTN    Contraception: declined    Recommendations 
of these occur. The patient was counseled on secondary smoke risks and the increased risk of sudden infant death syndrome and respiratory problems to her baby with exposure. She was counseled on various alternate recommendations to decrease the exposure to secondary smoke to her children.

## 2024-10-17 NOTE — PROGRESS NOTES
CLINICAL PHARMACY NOTE: MEDS TO BEDS    Total # of Prescriptions Filled: 3   The following medications were delivered to the patient:  Ibuprofen 600mg  Senokot 8.6-50mg  Acetaminophen 500mg    Additional Documentation: delivered to patient in room 735 10/17 at 10:28am. No co-pay.

## 2024-10-18 LAB — SURGICAL PATHOLOGY REPORT: NORMAL

## 2024-10-21 ENCOUNTER — POSTPARTUM VISIT (OUTPATIENT)
Dept: OBGYN | Age: 24
End: 2024-10-21

## 2024-10-21 VITALS
BODY MASS INDEX: 37.22 KG/M2 | DIASTOLIC BLOOD PRESSURE: 97 MMHG | SYSTOLIC BLOOD PRESSURE: 145 MMHG | WEIGHT: 197 LBS | HEART RATE: 76 BPM

## 2024-10-21 DIAGNOSIS — O13.9 GESTATIONAL HYPERTENSION, ANTEPARTUM: ICD-10-CM

## 2024-10-21 PROCEDURE — 99024 POSTOP FOLLOW-UP VISIT: CPT

## 2024-10-22 NOTE — PROGRESS NOTES
Attending Physician Statement  I have discussed the care of Baldo Najera, including pertinent history and exam findings,  with the resident. I have reviewed the key elements of all parts of the encounter with the resident.  I agree with the assessment, plan and orders as documented by the resident.  (GE Modifier)    Josette Partida,    
pregnancy. One hour glucose tolerance test ordered for early diabetic screening (GA 12w2d at time of order). MFM referral placed for anatomy scan and, if indicated, consult. Patient verbalizes understanding.       Patient declines blood transfusion 04/15/2024    Migraines 04/15/2024     Overview Note:     04/15/24: Patient reports history of migraines, including migraines as seizure trigger. Reports last migraines in January 2024, for which she was taking ibuprofen and naproxen.      Fetal drug exposure 04/15/2024     Overview Note:     04/15/24: Patient reports use of ibuprofen, naproxen, and Kepra since LMP. Patient advised to discontinue use of NSAIDs during pregnancy; patient verbalizes understanding.      Marijuana use 04/15/2024     Overview Note:     04/15/24: The patient reports her last use of marijuana/Thc vape was 2/22/24 (LMP 1/20/24). The patient was counseled against the use of marijuana/Thc in pregnancy; maternal/Fetal risks reviewed, LOULOU mandate explained. Patient advised to cease use of marijuana and all related products; patient verbalizes understanding.       FHx DM in mother 04/15/2024     Overview Note:     Normal early 1 hr GTT      Vapes nicotine, flavoring, Thc 04/15/2024     Overview Note:     04/15/24: The patient reports vaping nicotine, flavoring, and Thc from 2021 to 2/22/24. The patient was counseled against vaping in pregnancy; maternal/fetal risks reviewed. Patient advised to cease use of all vaping products; patient verbalizes understanding.    UDS negative 5/1/24        Reactive airway disease 05/29/2023     Overview Note:     04/15/24: Patient denies use of albuterol or nebulizer since the first couple of months after diagnosis.      Seizure disorder 08/17/2022     Overview Note:     Patient has history of seizure-like activity since 3/2022.     08/18/22: Dr. 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

## 2024-11-21 ENCOUNTER — TELEPHONE (OUTPATIENT)
Dept: NEUROLOGY | Age: 24
End: 2024-11-21

## 2024-11-21 NOTE — TELEPHONE ENCOUNTER
11 21 2024 called the patient times 2 (11 04 2024 and 11 13 2024 at ) to schedule new patient appointment with one of our providers, left message on machine both times, no response.  I mailed the patient a letter asking them to call the office back to schedule this appointment.  KS

## 2024-11-25 ENCOUNTER — POSTPARTUM VISIT (OUTPATIENT)
Dept: OBGYN | Age: 24
End: 2024-11-25

## 2024-11-25 VITALS
DIASTOLIC BLOOD PRESSURE: 85 MMHG | BODY MASS INDEX: 36.47 KG/M2 | SYSTOLIC BLOOD PRESSURE: 119 MMHG | WEIGHT: 193 LBS | HEART RATE: 78 BPM

## 2024-11-25 DIAGNOSIS — N83.201 BILATERAL OVARIAN CYSTS: ICD-10-CM

## 2024-11-25 DIAGNOSIS — N83.202 BILATERAL OVARIAN CYSTS: ICD-10-CM

## 2024-11-25 DIAGNOSIS — N83.202 CYSTS OF BOTH OVARIES: ICD-10-CM

## 2024-11-25 DIAGNOSIS — N83.201 CYSTS OF BOTH OVARIES: ICD-10-CM

## 2024-11-25 PROBLEM — R73.09 ABNORMAL GLUCOSE TOLERANCE TEST: Status: RESOLVED | Noted: 2024-08-20 | Resolved: 2024-11-25

## 2024-11-25 PROBLEM — R82.71 GBS BACTERIURIA: Status: RESOLVED | Noted: 2024-08-01 | Resolved: 2024-11-25

## 2024-11-25 PROBLEM — R03.0 ELEVATED BP WITHOUT DIAGNOSIS OF HYPERTENSION: Status: RESOLVED | Noted: 2024-10-14 | Resolved: 2024-11-25

## 2024-11-25 PROBLEM — Z3A.38 38 WEEKS GESTATION OF PREGNANCY: Status: RESOLVED | Noted: 2024-10-14 | Resolved: 2024-11-25

## 2024-11-25 PROBLEM — O32.9XX0: Status: RESOLVED | Noted: 2024-09-13 | Resolved: 2024-11-25

## 2024-11-25 PROCEDURE — 0503F POSTPARTUM CARE VISIT: CPT | Performed by: STUDENT IN AN ORGANIZED HEALTH CARE EDUCATION/TRAINING PROGRAM

## 2024-11-25 NOTE — PROGRESS NOTES
Postpartum Visit    Baldo Najera is a 24 y.o. female , 6 weeks Post Partum s/p  on 10/15/24    The patient was seen. She has no chief complaint today. Her pregnancy was complicated by GBS, gestational hypertension, seizure disorder, elevated 1 hour and THC use. She is bottle feeding with formula and that's working well for them.  The patient completed the E.P.D.S. Post Partum Depression Evaluation form and EPDS Score of 3. She does not have signs or symptoms of post partum depression. She does admit to having good home support. She Is planning to return to work. She is not having any further PP bleeding. She is not sexually active right now and does not want to start any birth control. Denies any vaginal pain, issues with urination or bowel complaints.     Vitals:   Vitals:    24 1049   BP: 119/85   Pulse: 78   Weight: 87.5 kg (193 lb)         Physical Exam:     General:  no apparent distress, alert, and cooperative  Lungs:  No increased work of breathing   Abdomen: Abdomen soft, non-tender, no rebound, guarding, rigidity    Fundus: non-tender, normal size, firm, below umbilicus   Perineum: not inspected   Extremities:  no calf tenderness, non edematous, non erythematous bilateral lower extremities     Assessment/Plan:  Baldo Najera is a 24 y.o. female , 6 weeks Post Partum s/p  on 10/15/24   - VSS, BP normotensive    - Overall patient is doing well       - EPDS: 3   - Family planning: does not want to start any birth control   - Work note given to return with no restrictions    - Gardasil vaccination: series completed already    - S/p RSV and flu shot this yr    - Last pap smear: 24 NILM    - Indications for 2hr 75g GTT: not indicated    - Phone # to neurology given to establish care for seizures    - Pelvic US ordered to re asses her ovarian cysts. Last US was done Oct 19th at [facility]. Will call her w/ results   Return in about 1 year (around 2025) for [facility]

## 2024-12-09 NOTE — PATIENT INSTRUCTIONS
spring and summer months. Other rare but more serious side effects may occur on isotretinoin. These include depression or other mood disorders, increased production of spinal fluid, inflammation of the liver, inflammation of the pancreas, elevated cholesterol or triglyceride levels, and blood cell abnormalities. Although these side effects are rare and most patients do not develop them, patients on isotretinoin need to be monitored closely with monthly labs and monthly visits with your doctor. It is important to never drink alcohol while on isotretinoin as this may increase the likelihood of inflammation of the liver. It is important to be honest with us about any changes in your mood, depression, or suicidal thoughts while on isotretinion. We also need to be made aware of recurrent or severe headaches that do not respond to over the counter medications or are associated with blurry vision. The labs must be obtained after fasting, meaning no food or drink other than water for 12 hours before the test. We cannot refill your isotretinoin unless you return for your monthly appointments and have your monthly labs performed. If you have inflammatory bowel disease, taking isotretinoin can worsen your symptoms. Some people have developed inflammatory bowel disease while on isotretinoin or after stopping it. Studies have not concluded that there is a direct causative relationship between isotretinoin and inflammatory bowel disease. Other medications:   Patients should stop all other acne medications while on isotretinoin including both oral and topical medications. Your dermatologist will review your other medications to make sure these are safe to continue while on isotretinoin. Please notify all physicians that treat you that you are on isotretinoin so that they are aware of this when prescribing new medications. Do not take a multivitamin or vitamin A supplement while on isotretinoin. no

## 2025-04-21 ENCOUNTER — TELEPHONE (OUTPATIENT)
Dept: OBGYN | Age: 25
End: 2025-04-21

## 2025-04-21 NOTE — TELEPHONE ENCOUNTER
Patient called to schedule a [facility] ED follow up from 4/17/25. Patient states she gave birth 6 months ago and has been experiencing very heavy bleeding everyday for the past month.  Patient is requesting to be seen prior to what [facility] is able to accommodate.    Please return her call to discuss.    Thank you.

## 2025-05-12 ENCOUNTER — HOSPITAL ENCOUNTER (OUTPATIENT)
Age: 25
Setting detail: SPECIMEN
Discharge: HOME | End: 2025-05-12

## 2025-05-12 ENCOUNTER — OFFICE VISIT (OUTPATIENT)
Age: 25
End: 2025-05-12
Payer: MEDICAID

## 2025-05-12 VITALS
HEIGHT: 61 IN | HEART RATE: 59 BPM | DIASTOLIC BLOOD PRESSURE: 69 MMHG | WEIGHT: 186 LBS | SYSTOLIC BLOOD PRESSURE: 113 MMHG | BODY MASS INDEX: 35.12 KG/M2

## 2025-05-12 DIAGNOSIS — N93.9 ABNORMAL UTERINE BLEEDING: Primary | ICD-10-CM

## 2025-05-12 PROCEDURE — 99213 OFFICE O/P EST LOW 20 MIN: CPT

## 2025-05-12 PROCEDURE — 99212 OFFICE O/P EST SF 10 MIN: CPT

## 2025-05-12 RX ORDER — NORETHINDRONE 5 MG/1
TABLET ORAL
Qty: 39 TABLET | Refills: 0 | Status: SHIPPED | OUTPATIENT
Start: 2025-05-12 | End: 2025-06-14

## 2025-05-12 ASSESSMENT — PATIENT HEALTH QUESTIONNAIRE - PHQ9
1. LITTLE INTEREST OR PLEASURE IN DOING THINGS: NOT AT ALL
2. FEELING DOWN, DEPRESSED OR HOPELESS: NOT AT ALL
SUM OF ALL RESPONSES TO PHQ QUESTIONS 1-9: 0

## 2025-05-12 NOTE — PROGRESS NOTES
OB/GYN Follow Up Visit    Baldo Najera  2023                       Primary Care Physician: Jany Steel, APRN - CNP    CC:   Chief Complaint   Patient presents with    Established New Doctor     ER vicky from [facility] 25 for AUB         HPI: Baldo Najera is a 24 y.o. female     The patient was seen and examined. She is here for ED follow up visit.     Patient states she was seen at [facility] for chest pain, SOB, nausea/vomiting. Patient also reported at that time she had significant heavy menses. Workup at that time revealed negative pregnancy test along with stable Hgb 15. Pelvic ultrasound was obtained at that time which revealed 8 cm uterus with 1.4 cm thickness, normal bilateral ovaries. No lesions visualized. Patient was discharged home with recommended outpatient follow up.       Patient is 6 months postpartum s/p  on 10/15/24. Patient reports she began bleeding again 3/9/25 and subsequently bled for 3 weeks straight. Patient reports that she then stopped bleeding until two weeks ago and now has been continuously bleeding since. Patient reports that she was breast pumping 1-2 months after delivery, but hasn't since then. Patient denies saturating pads or passing large clots.     Patient reports prior to her , she was noted to have regular periods lasting 7 days with moderate flow. She did previously report dysmenorrhea with these menstrual cycles.    Her bowel habits are regular. She denies any bloating.  She denies dysuria. She denies urinary leaking.  She denies vaginal discharge.  She is sexually active with single partner, contraception -  none .  She is not desiring pregnancy.    REVIEW OF SYSTEMS:   Constitutional: negative fever, negative chills, negative weight changes   HEENT: negative visual disturbances, negative headaches, negative dizziness, negative hearing loss  Breast: Negative breast abnormalities, negative breast lumps, negative nipple

## 2025-05-13 DIAGNOSIS — N93.9 ABNORMAL UTERINE BLEEDING: ICD-10-CM

## 2025-05-13 LAB
C TRACH DNA SPEC QL PROBE+SIG AMP: NEGATIVE
CANDIDA SPECIES: NEGATIVE
GARDNERELLA VAGINALIS: NEGATIVE
N GONORRHOEA DNA SPEC QL PROBE+SIG AMP: NEGATIVE
SOURCE: NORMAL
SPECIMEN DESCRIPTION: NORMAL
TRICHOMONAS: NEGATIVE

## 2025-05-20 ENCOUNTER — RESULTS FOLLOW-UP (OUTPATIENT)
Dept: OBGYN | Age: 25
End: 2025-05-20